# Patient Record
Sex: MALE | Race: WHITE | NOT HISPANIC OR LATINO | Employment: FULL TIME | URBAN - METROPOLITAN AREA
[De-identification: names, ages, dates, MRNs, and addresses within clinical notes are randomized per-mention and may not be internally consistent; named-entity substitution may affect disease eponyms.]

---

## 2017-05-25 ENCOUNTER — TRANSFERRED RECORDS (OUTPATIENT)
Dept: HEALTH INFORMATION MANAGEMENT | Facility: CLINIC | Age: 47
End: 2017-05-25

## 2018-08-06 ENCOUNTER — HOSPITAL ENCOUNTER (EMERGENCY)
Facility: CLINIC | Age: 48
Discharge: HOME | End: 2018-08-06
Attending: EMERGENCY MEDICINE | Admitting: EMERGENCY MEDICINE
Payer: COMMERCIAL

## 2018-08-06 ENCOUNTER — APPOINTMENT (OUTPATIENT)
Dept: CT IMAGING | Facility: CLINIC | Age: 48
End: 2018-08-06
Attending: EMERGENCY MEDICINE
Payer: COMMERCIAL

## 2018-08-06 VITALS
OXYGEN SATURATION: 100 % | SYSTOLIC BLOOD PRESSURE: 133 MMHG | TEMPERATURE: 98.2 F | RESPIRATION RATE: 16 BRPM | DIASTOLIC BLOOD PRESSURE: 94 MMHG

## 2018-08-06 DIAGNOSIS — N20.0 CALCULUS OF LEFT KIDNEY: ICD-10-CM

## 2018-08-06 PROCEDURE — 99284 EMERGENCY DEPT VISIT MOD MDM: CPT | Mod: 25

## 2018-08-06 PROCEDURE — 74176 CT ABD & PELVIS W/O CONTRAST: CPT

## 2018-08-06 RX ORDER — ONDANSETRON 4 MG/1
4 TABLET, FILM COATED ORAL EVERY 8 HOURS PRN
Qty: 6 TABLET | Refills: 0 | Status: SHIPPED | OUTPATIENT
Start: 2018-08-06 | End: 2018-09-27

## 2018-08-06 RX ORDER — OXYCODONE HYDROCHLORIDE 5 MG/1
5 TABLET ORAL EVERY 6 HOURS PRN
Qty: 12 TABLET | Refills: 0 | Status: SHIPPED | OUTPATIENT
Start: 2018-08-06 | End: 2018-09-27

## 2018-08-06 RX ORDER — TAMSULOSIN HYDROCHLORIDE 0.4 MG/1
0.4 CAPSULE ORAL DAILY
Qty: 10 CAPSULE | Refills: 0 | Status: SHIPPED | OUTPATIENT
Start: 2018-08-06 | End: 2018-08-16

## 2018-08-06 ASSESSMENT — ENCOUNTER SYMPTOMS
FLANK PAIN: 1
DYSURIA: 1
VOMITING: 0
ABDOMINAL PAIN: 1
HEMATURIA: 0
FEVER: 0
CONSTIPATION: 0
NAUSEA: 1

## 2018-08-06 NOTE — ED AVS SNAPSHOT
[facility] Emergency Department    [address]    [facility] [address]    Phone:  [phone]    Fax:  [phone]                                       Arley Roldan   MRN: [id number]    Department:  [facility] Emergency Department   Date of Visit:  8/6/2018           Patient Information     Date Of Birth          1970        Your diagnoses for this visit were:     Calculus of left kidney        You were seen by Brandon Gaston MD.      Follow-up Information     Follow up with Urology.    Why:  As needed if no stone passage/relief of symptoms in next 3-4 days        Discharge Instructions       Discharge Instructions  Kidney Stones    Kidney stones are a common problem that can cause a lot of pain but fortunately are usually not dangerous. Kidney stones form in the kidney and then can cause a blockage (obstruction) of the flow of urine from the kidney which leads to pain. Most patients can manage kidney stones at home (without a hospital stay).  However, sometimes your condition may be worse than it seemed at first, or may get worse with time. Most kidney stones will pass on their own, but occasionally stones may need to be removed by an urologist.    Generally, every Emergency Department visit should have a follow-up clinic visit with either a primary or a specialty clinic/provider. Please follow-up as instructed by your emergency provider today.      Return to the Emergency Department if:    Your pain is not controlled despite the medications provided or recommended.    You are vomiting (throwing up) and cannot keep fluids or medications down.    You develop a fever (>100.4 F).    You feel much more ill or develop new symptoms.  What can I do to help myself?    Be sure to drink plenty of fluids.    If instructed to do so, strain your urine (pee) with the urine strainer you were provided with today. Your stone may look like a grain of sand or a small pebble.  Collect any stones in the cup provided and bring to your follow-up appointment.    Staying active is good, and may help the stone to pass. You may do whatever you feel up to doing without restrictions.   Treatment:    Non-steroidal anti-inflammatory drugs (NSAIDs). This includes prescription medicines like Toradol  (ketorolac) and non-prescription medicines like Advil  (ibuprofen) and Nuprin  (ibuprofen) and Naproxen. These pain relievers are very effective for kidney stones.    Nausea (sick to your stomach) medication.  Nausea and vomiting are common with kidney stones, so your provider may send you home with medicine for this.     Flomax  (tamsulosin). This medicine is sometimes used for men with prostate problems, but also can help kidney stones to pass. Its effectiveness is controversial or questionable so it is prescribed in certain situations. This medicine can lower blood pressure, and you may feel faint/lightheaded, especially when you first stand up. Be sure to get up gradually, sit down if you feel faint, and avoid activity where feeling faint would be dangerous, such as climbing ladders.  If you were given a prescription for medicine here today, be sure to read all of the information (including the package insert) that comes with your prescription.  This will include important information about the medicine, its side effects, and any warnings that you need to know about.  The pharmacist who fills the prescription can provide more information and answer questions you may have about the medicine.  If you have questions or concerns that the pharmacist cannot address, please call or return to the Emergency Department.   Remember that you can always come back to the Emergency Department if you are not able to see your regular provider in the amount of time listed above, if you get any new symptoms, or if there is anything that worries you.  Opioid Medication Information    You have been given a prescription  for an opioid (narcotic) pain medicine and/or have received a pain medicine while here in the Emergency Department. These medicines can make you drowsy or impaired. You must not drive, operate dangerous equipment, or engage in any other dangerous activities while taking these medications. If you drive while taking these medications, you could be arrested for driving under the influence (DUI). Do not drink any alcohol while you are taking these medications.     Opioid pain medications can cause addiction. If you have a history of chemical dependency of any type, you are at a higher risk of becoming addicted to pain medications.  Only take these prescribed medications to treat your pain when all other options have been tried. Take it for as short a time and as few doses as possible. Store your pain pills in a secure place, as they are frequently stolen and provide a dangerous opportunity for children or visitors in your house to start abusing these powerful medications. We will not replace any lost or stolen medicine.    If you do not finish your medication, it is a good idea to get rid of it but please do not flush it down the toilet. Please dispose of the remaining medication at a local pharmacy or law enforcement facility. The [place] Pollution [employer] Agency has additional information on medication disposal: https://www.pca.[facility].[place]/living-green/managing-unwanted-medications.      Many prescription pain medications contain Tylenol  (acetaminophen), including Vicodin , Tylenol #3 , Norco , Lortab , and Percocet .  You should not take any extra pills of Tylenol  if you are using these prescription medications or you can get very sick.  Do not ever take more than 3000 mg of acetaminophen in any 24 hour period.    All opioids tend to cause constipation. Drink plenty of water and eat foods that have a lot of fiber, such as fruits, vegetables, prune juice, apple juice and high fiber cereal.  Take a laxative if you  don t move your bowels at least every other day. Miralax , Milk of Magnesia, Colace , or Senna  can be used to keep you regular.          Your next 10 appointments already scheduled     Aug 09, 2018 10:00 AM CDT   Office Visit with Hamilton Hanna MD   [facility] ([facility] [place])    [address]  [address]  [facility] [place] [address] [phone]           Bring a current list of meds and any records pertaining to this visit. For Physicals, please bring immunization records and any forms needing to be filled out. Please arrive 10 minutes early to complete paperwork.              24 Hour Appointment Hotline       To make an appointment at any [facility], call [phone] ([phone]). If you don't have a family doctor or clinic, we will help you find one. [facility] are conveniently located to serve the needs of you and your family.             Review of your medicines      START taking        Dose / Directions Last dose taken    oxyCODONE IR 5 MG tablet   Commonly known as:  ROXICODONE   Dose:  5 mg   Quantity:  12 tablet        Take 1 tablet (5 mg) by mouth every 6 hours as needed for pain   Refills:  0        tamsulosin 0.4 MG capsule   Commonly known as:  FLOMAX   Dose:  0.4 mg   Quantity:  10 capsule        Take 1 capsule (0.4 mg) by mouth daily for 10 doses   Refills:  0                Information about OPIOIDS     PRESCRIPTION OPIOIDS: WHAT YOU NEED TO KNOW   We gave you an opioid (narcotic) pain medicine. It is important to manage your pain, but opioids are not always the best choice. You should first try all the other options your care team gave you. Take this medicine for as short a time (and as few doses) as possible.     These medicines have risks:    DO NOT drive when on new or higher doses of pain medicine. These medicines can affect your alertness and reaction times, and you could be arrested for driving under the influence (DUI). If you  need to use opioids long-term, talk to your care team about driving.    DO NOT operate heave machinery    DO NOT do any other dangerous activities while taking these medicines.     DO NOT drink any alcohol while taking these medicines.      If the opioid prescribed includes acetaminophen, DO NOT take with any other medicines that contain acetaminophen. Read all labels carefully. Look for the word  acetaminophen  or  Tylenol.  Ask your pharmacist if you have questions or are unsure.    You can get addicted to pain medicines, especially if you have a history of addiction (chemical, alcohol or substance dependence). Talk to your care team about ways to reduce this risk.    Store your pills in a secure place, locked if possible. We will not replace any lost or stolen medicine. If you don t finish your medicine, please throw away (dispose) as directed by your pharmacist. The [place] Pollution Control Agency has more information about safe disposal: https://www.pca.[facility].[place]/living-green/managing-unwanted-medications.     All opioids tend to cause constipation. Drink plenty of water and eat foods that have a lot of fiber, such as fruits, vegetables, prune juice, apple juice and high-fiber cereal. Take a laxative (Miralax, milk of magnesia, Colace, Senna) if you don t move your bowels at least every other day.         Prescriptions were sent or printed at these locations (2 Prescriptions)                   Other Prescriptions                Printed at Department/Unit printer (2 of 2)         oxyCODONE IR (ROXICODONE) 5 MG tablet               tamsulosin (FLOMAX) 0.4 MG capsule                Procedures and tests performed during your visit     CT Abdomen Pelvis w/o Contrast      Orders Needing Specimen Collection     None      Pending Results     Date and Time Order Name Status Description    8/6/2018 1144 CT Abdomen Pelvis w/o Contrast Preliminary             Pending Culture Results     No orders found from 8/4/2018  to 8/7/2018.            Pending Results Instructions     If you had any lab results that were not finalized at the time of your Discharge, you can call the ED Lab Result RN at [phone]. You will be contacted by this team for any positive Lab results or changes in treatment. The nurses are available 7 days a week from 10A to 6:30P.  You can leave a message 24 hours per day and they will return your call.        Test Results From Your Hospital Stay        8/6/2018 12:53 PM      Narrative     CT ABDOMEN/PELVIS WITHOUT CONTRAST August 6, 2018 12:46 PM     HISTORY: Left flank pain.     TECHNIQUE: Noncontrast CT abdomen and pelvis was performed. Radiation  dose for this scan was reduced using automated exposure control,  adjustment of the mA and/or kV according to patient size, or iterative  reconstruction technique.    COMPARISON: None.    FINDINGS:   Left kidney: Distal left ureter stone just in the region of the  ureterovesical junction measures 0.2 cm in size series 2 image 75.  Mild left hydronephrosis. Intrarenal stone upper left kidney is 0.1 cm  series 3 image 81.    Right kidney: Intrarenal stone at the right mid kidney is 0.2 cm  series 2 image 30. No hydronephrosis on the right.    Additional findings: Prominent prostate measuring 5.1 cm image 80. No  acute bowel abnormality. No evidence for appendicitis. No free fluid  or free air.    Cholecystectomy. Liver, spleen, adrenals, and pancreas shows no acute  abnormalities.        Impression     IMPRESSION:   1. Distal left ureter stone at the ureterovesical junction is 0.2 cm.  Mild left hydronephrosis.  2. Small bilateral intrarenal stones.                Clinical Quality Measure: Blood Pressure Screening     Your blood pressure was checked while you were in the emergency department today. The last reading we obtained was  BP: 141/88 . Please read the guidelines below about what these numbers mean and what you should do about them.  If your systolic blood  "pressure (the top number) is less than 120 and your diastolic blood pressure (the bottom number) is less than 80, then your blood pressure is normal. There is nothing more that you need to do about it.  If your systolic blood pressure (the top number) is 120-139 or your diastolic blood pressure (the bottom number) is 80-89, your blood pressure may be higher than it should be. You should have your blood pressure rechecked within a year by a primary care provider.  If your systolic blood pressure (the top number) is 140 or greater or your diastolic blood pressure (the bottom number) is 90 or greater, you may have high blood pressure. High blood pressure is treatable, but if left untreated over time it can put you at risk for heart attack, stroke, or kidney failure. You should have your blood pressure rechecked by a primary care provider within the next 4 weeks.  If your provider in the emergency department today gave you specific instructions to follow-up with your doctor or provider even sooner than that, you should follow that instruction and not wait for up to 4 weeks for your follow-up visit.        Thank you for choosing [facility]       Thank you for choosing [facility] for your care. Our goal is always to provide you with excellent care. Hearing back from our patients is one way we can continue to improve our services. Please take a few minutes to complete the written survey that you may receive in the mail after you visit with us. Thank you!        [employer] Information     [employer] lets you send messages to your doctor, view your test results, renew your prescriptions, schedule appointments and more. To sign up, go to [email or web address] . Click on \"Log in\" on the left side of the screen, which will take you to the Welcome page. Then click on \"Sign up Now\" on the right side of the page.     You will be asked to enter the access code listed below, as well as some personal information. Please follow the " directions to create your username and password.     Your access code is: [email or web address]  Expires: 2018  2:00 PM     Your access code will  in 90 days. If you need help or a new code, please call your [facility] clinic or [phone].        Care EveryWhere ID     This is your Care EveryWhere ID. This could be used by other organizations to access your [facility] medical records  [id number]        Equal Access to Services     ADOLFO ROY : Hadii angela hall hadasho Soomaali, waaxda luqadaha, qaybta kaalmada adeegyashelby, [place] . So [facility] [phone].    ATENCIÓN: Si habla español, tiene a martinez disposición servicios gratuitos de asistencia lingüística. Llame al [phone].    We comply with applicable federal civil rights laws and [place] laws. We do not discriminate on the basis of race, color, national origin, age, disability, sex, sexual orientation, or gender identity.            After Visit Summary       This is your record. Keep this with you and show to your community pharmacist(s) and doctor(s) at your next visit.

## 2018-08-06 NOTE — ED TRIAGE NOTES
Left upper quadrant abdominal pain that started Sunday. Seen at [facility] and sent here. Has had nausea. Not aware of fever. Pain today is constant.

## 2018-08-06 NOTE — ED AVS SNAPSHOT
[facility] Emergency Department    [address]    [facility] [address]    Phone:  [phone]    Fax:  [phone]                                       Arley Roldan   MRN: [id number]    Department:  [facility] Emergency Department   Date of Visit:  8/6/2018           After Visit Summary Signature Page     I have received my discharge instructions, and my questions have been answered. I have discussed any challenges I see with this plan with the nurse or doctor.    ..........................................................................................................................................  Patient/Patient Representative Signature      ..........................................................................................................................................  Patient Representative Print Name and Relationship to Patient    ..................................................               ................................................  Date                                            Time    ..........................................................................................................................................  Reviewed by Signature/Title    ...................................................              ..............................................  Date                                                            Time

## 2018-08-06 NOTE — DISCHARGE INSTRUCTIONS
Discharge Instructions  Kidney Stones    Kidney stones are a common problem that can cause a lot of pain but fortunately are usually not dangerous. Kidney stones form in the kidney and then can cause a blockage (obstruction) of the flow of urine from the kidney which leads to pain. Most patients can manage kidney stones at home (without a hospital stay).  However, sometimes your condition may be worse than it seemed at first, or may get worse with time. Most kidney stones will pass on their own, but occasionally stones may need to be removed by an urologist.    Generally, every Emergency Department visit should have a follow-up clinic visit with either a primary or a specialty clinic/provider. Please follow-up as instructed by your emergency provider today.      Return to the Emergency Department if:    Your pain is not controlled despite the medications provided or recommended.    You are vomiting (throwing up) and cannot keep fluids or medications down.    You develop a fever (>100.4 F).    You feel much more ill or develop new symptoms.  What can I do to help myself?    Be sure to drink plenty of fluids.    If instructed to do so, strain your urine (pee) with the urine strainer you were provided with today. Your stone may look like a grain of sand or a small pebble. Collect any stones in the cup provided and bring to your follow-up appointment.    Staying active is good, and may help the stone to pass. You may do whatever you feel up to doing without restrictions.   Treatment:    Non-steroidal anti-inflammatory drugs (NSAIDs). This includes prescription medicines like Toradol  (ketorolac) and non-prescription medicines like Advil  (ibuprofen) and Nuprin  (ibuprofen) and Naproxen. These pain relievers are very effective for kidney stones.    Nausea (sick to your stomach) medication.  Nausea and vomiting are common with kidney stones, so your provider may send you home with medicine for this.     Flomax   (tamsulosin). This medicine is sometimes used for men with prostate problems, but also can help kidney stones to pass. Its effectiveness is controversial or questionable so it is prescribed in certain situations. This medicine can lower blood pressure, and you may feel faint/lightheaded, especially when you first stand up. Be sure to get up gradually, sit down if you feel faint, and avoid activity where feeling faint would be dangerous, such as climbing ladders.  If you were given a prescription for medicine here today, be sure to read all of the information (including the package insert) that comes with your prescription.  This will include important information about the medicine, its side effects, and any warnings that you need to know about.  The pharmacist who fills the prescription can provide more information and answer questions you may have about the medicine.  If you have questions or concerns that the pharmacist cannot address, please call or return to the Emergency Department.   Remember that you can always come back to the Emergency Department if you are not able to see your regular provider in the amount of time listed above, if you get any new symptoms, or if there is anything that worries you.  Opioid Medication Information    You have been given a prescription for an opioid (narcotic) pain medicine and/or have received a pain medicine while here in the Emergency Department. These medicines can make you drowsy or impaired. You must not drive, operate dangerous equipment, or engage in any other dangerous activities while taking these medications. If you drive while taking these medications, you could be arrested for driving under the influence (DUI). Do not drink any alcohol while you are taking these medications.     Opioid pain medications can cause addiction. If you have a history of chemical dependency of any type, you are at a higher risk of becoming addicted to pain medications.  Only take these  prescribed medications to treat your pain when all other options have been tried. Take it for as short a time and as few doses as possible. Store your pain pills in a secure place, as they are frequently stolen and provide a dangerous opportunity for children or visitors in your house to start abusing these powerful medications. We will not replace any lost or stolen medicine.    If you do not finish your medication, it is a good idea to get rid of it but please do not flush it down the toilet. Please dispose of the remaining medication at a local pharmacy or law enforcement facility. The [place] Pollution Control Agency has additional information on medication disposal: https://www.pca.[email or web address]/living-green/managing-unwanted-medications.      Many prescription pain medications contain Tylenol  (acetaminophen), including Vicodin , Tylenol #3 , Norco , Lortab , and Percocet .  You should not take any extra pills of Tylenol  if you are using these prescription medications or you can get very sick.  Do not ever take more than 3000 mg of acetaminophen in any 24 hour period.    All opioids tend to cause constipation. Drink plenty of water and eat foods that have a lot of fiber, such as fruits, vegetables, prune juice, apple juice and high fiber cereal.  Take a laxative if you don t move your bowels at least every other day. Miralax , Milk of Magnesia, Colace , or Senna  can be used to keep you regular.

## 2018-08-06 NOTE — ED PROVIDER NOTES
History     Chief Complaint:  Abdominal pain    HPI   Arley Roldan is a 47 year old male who presents with abdominal pain. Yesterday morning, the patient reports a one hour episode of upper left quadrant abdominal pain. This morning he woke up with similar pain and decided to go in for evaluation. Prior to arrival he was seen at [facility], where he was given a Toradol shot and sent here for further evaluation. Upon arrival to the ED he notes the pain has somewhat diminished. He reports that it does, however, radiate towards his left flank and also gives him some nausea and slight dysuria. He denies any fever, vomiting, hematuria, or any other symptoms at this time.     Allergies:  No Known Drug Allergies    Medications:    The patient is not currently taking any prescribed medications.    Past Medical History:    Migraines    Past Surgical History:    Cholecystectomy    Family History:    No past pertinent family history.    Social History:  The patient was accompanied to the ED by his wife.  Smoking Status: No  Smokeless Tobacco: No  Alcohol Use: Yes  Marital Status:        Review of Systems   Constitutional: Negative for fever.   Gastrointestinal: Positive for abdominal pain and nausea. Negative for constipation and vomiting.   Genitourinary: Positive for dysuria and flank pain. Negative for hematuria.   All other systems reviewed and are negative.    Physical Exam   Vitals:  Patient Vitals for the past 24 hrs:   BP Temp Temp src Heart Rate Resp SpO2   08/06/18 1407 - - - - - 100 %   08/06/18 1405 (!) 133/94 - - 60 16 96 %   08/06/18 1230 (!) 142/92 - - - - -   08/06/18 1215 136/90 - - - - 100 %   08/06/18 1200 134/90 - - - - 98 %   08/06/18 1145 (!) 132/92 - - - - 99 %   08/06/18 1130 (!) 127/91 - - - - 100 %   08/06/18 1127 141/88 98.2  F (36.8  C) Oral 77 16 100 %     Physical Exam  General: Patient is alert and cooperative.  HENT:  Normal appearance.   Eyes: EOMI. Normal  conjunctiva.  Neck:  Normal range of motion and appearance.   Cardiovascular:  Normal rate.  Pulmonary/Chest:  Effort normal.   Abdominal: Soft. No distension or tenderness.     Musculoskeletal: Normal range of motion.   Neurological: oriented, normal strength, sensation, and coordination.   Skin: Warm and dry. No rash or bruising.   Psychiatric: Normal mood and affect. Normal behavior and judgement.      Emergency Department Course     Imaging:  Radiology findings were communicated with the patient who voiced understanding of the findings.  CT abdomen Pelvis w/o contrast:  1. Distal left ureter stone at the ureterovesical junction is 0.2 cm. Mild left hydronephrosis.  2. Small bilateral intrarenal stones.    Per Radiologist.     Emergency Department Course:  Nursing notes and vitals reviewed.  1137 I had my initial encounter with the patient.  I performed an exam of the patient as documented above.   The patient was sent for a CT while in the emergency department, results above.     1357 I discussed the treatment plan with the patient. They expressed understanding of this plan and consented to discharge. They will be discharged home with instructions for care and follow up. In addition, the patient will return to the emergency department with any new or worsening symptoms.  All questions were answered.  Impression & Plan      Medical Decision Making:  The patient presented with unilateral flank and abdominal pain consistent with renal colic. CT confirms a ureteral stone.  Pain is controlled with interventions in the Emergency Department. There is no fever or evidence of a urinary tract infection.  The patient will be discharged with opioid analgesics and Ibuprofen for pain. Flomax will be prescribed daily to attempt to ease stone passage.  Other etiologies for these symptoms (AAA, pyelonephritis, amongst others) are considered and have been excluded.  Return if increasing pain not controlled with pain meds,  vomiting, and fever.  Strain urine to look for stone, if detected, submit to primary doctor for lab analysis.  Follow up with urology within one week, sooner if pain continues, as retrieval of the stone may be required for refractory symptoms.        Diagnosis:    ICD-10-CM    1. Calculus of left kidney N20.0      Disposition:   Discharge    Discharge Medications:  Discharge Medication List as of 8/6/2018  2:00 PM      START taking these medications    Details   oxyCODONE IR (ROXICODONE) 5 MG tablet Take 1 tablet (5 mg) by mouth every 6 hours as needed for pain, Disp-12 tablet, R-0, Local Print      tamsulosin (FLOMAX) 0.4 MG capsule Take 1 capsule (0.4 mg) by mouth daily for 10 doses, Disp-10 capsule, R-0, Local Print           Scribe Disclosure:  New GARCIA, am serving as a scribe at 11:26 AM on 8/6/2018 to document services personally performed by Brandon Gaston MD, based on my observations and the provider's statements to me.  8/6/2018   [facility] EMERGENCY DEPARTMENT     Brandon Gaston MD  08/07/18 9416

## 2018-08-06 NOTE — LETTER
August 6, 2018      To Whom It May Concern:      Arley Roldan was seen in our Emergency Department today, 08/06/18.  I expect his condition to improve over the next 3 days.  He may return to work/school when improved.    Sincerely,        Dr. Gaston/ JAIME Zhou

## 2018-08-09 ENCOUNTER — OFFICE VISIT (OUTPATIENT)
Dept: PEDIATRICS | Facility: CLINIC | Age: 48
End: 2018-08-09
Payer: COMMERCIAL

## 2018-08-09 VITALS
WEIGHT: 154 LBS | SYSTOLIC BLOOD PRESSURE: 118 MMHG | OXYGEN SATURATION: 97 % | BODY MASS INDEX: 23.34 KG/M2 | HEART RATE: 82 BPM | DIASTOLIC BLOOD PRESSURE: 70 MMHG | HEIGHT: 68 IN | TEMPERATURE: 98.9 F

## 2018-08-09 DIAGNOSIS — N20.0 NEPHROLITHIASIS: ICD-10-CM

## 2018-08-09 DIAGNOSIS — G43.109 MIGRAINE WITH AURA AND WITHOUT STATUS MIGRAINOSUS, NOT INTRACTABLE: Primary | ICD-10-CM

## 2018-08-09 PROCEDURE — 99204 OFFICE O/P NEW MOD 45 MIN: CPT | Performed by: STUDENT IN AN ORGANIZED HEALTH CARE EDUCATION/TRAINING PROGRAM

## 2018-08-09 RX ORDER — CHOLESTYRAMINE
POWDER (GRAM) MISCELLANEOUS
Status: CANCELLED | COMMUNITY
Start: 2018-08-09

## 2018-08-09 RX ORDER — CHOLESTYRAMINE 4 G/9G
4 POWDER, FOR SUSPENSION ORAL DAILY
Qty: 378 G | Refills: 1 | Status: SHIPPED | OUTPATIENT
Start: 2018-08-09 | End: 2019-01-18

## 2018-08-09 RX ORDER — ZOLMITRIPTAN 5 MG/1
5 TABLET, FILM COATED ORAL
Qty: 6 TABLET | Refills: 3 | COMMUNITY
Start: 2018-08-09 | End: 2019-05-03

## 2018-08-09 NOTE — PATIENT INSTRUCTIONS
"  Migraine Headache: Stages and Treatment    A migraine headache tends to progress in stages. Learning these stages can help you better understand what is happening. Then you can learn ways to reduce pain and relieve other symptoms. Methods for relieving your symptoms include self-care and medicines.  Migraine stages  Migraines tend to progress through 4 stages. Many people don't have all stages, and stages may differ with each headache:    Prodrome. A few hours to a day or so before the headache, you may feel tired, (yawning many times), uneasy, or lemus. You may also feel bloated or crave certain foods.    Aura. Up to an hour before the headache starts, some migraine sufferers experience aura--flashing lights, blind spots, other vision problems, confusion, difficulty speaking, or other neurologic symptoms.    Headache. Moderate to severe pain affects one side of the head and then can spread to both sides, often along with nausea. You may be highly sensitive to light, sound, and odors. Vomiting or diarrhea may also happen. This stage lasts 4 to 72 hours.    Postdrome. After your headache ends, you may feel tired, achy, and \"washed out.\" This may last for a day or so.  Self-care during a migraine  Here is what you can do:    Use a cold compress. Wrap a thin cloth around a cold pack, a cold can of soda, or a bag of frozen vegetables. Apply this to your temple or other pain site.    Drink fluids. If nausea makes it hard to drink, try sucking on ice.    Rest. If possible, lie down. Try not to bend over, as this may increase your pain. Sometimes laying in a dark quiet room can help the migraine from being aggravated.      Try caffeine. Some people find that drinking fluids with caffeine, such as coffee or tea, helps to lessen migraine pain.  Using medicines  Work with your healthcare provider to find the right medicines for you. Medicines for migraine may relieve pain (analgesics), relieve nausea, or attack the " migraine's root causes (migraine-specific medicines).  Rebound headache  Taking analgesics each day, or even several times a week, may lead to more frequent and severe headaches. These are called rebound headaches. If you think you're having rebound headaches, tell your healthcare provider. He or she can help you safely decrease your medicine. Rebound caffeine withdrawal headaches can also happen.    Date Last Reviewed: 10/9/2015    2372-6748 [employer]. [address] [place] [address], [place] [address]. All rights reserved. This information is not intended as a substitute for professional medical care. Always follow your healthcare professional's instructions.    Please continue to take your medications as prescribed.  Please call when refills are needed.  Call with questions or concerns!    Hamilton Hanna MD

## 2018-08-09 NOTE — MR AVS SNAPSHOT
"              After Visit Summary   8/9/2018    Arley Roldan    MRN: [id number]           Patient Information     Date Of Birth          1970        Visit Information        Provider Department      8/9/2018 10:00 AM Hamilton Hanna MD [facility]        Today's Diagnoses     Calculus of kidney    -  1    Migraine with aura and without status migrainosus, not intractable        S/P cholecystectomy          Care Instructions      Migraine Headache: Stages and Treatment    A migraine headache tends to progress in stages. Learning these stages can help you better understand what is happening. Then you can learn ways to reduce pain and relieve other symptoms. Methods for relieving your symptoms include self-care and medicines.  Migraine stages  Migraines tend to progress through 4 stages. Many people don't have all stages, and stages may differ with each headache:    Prodrome. A few hours to a day or so before the headache, you may feel tired, (yawning many times), uneasy, or lemus. You may also feel bloated or crave certain foods.    Aura. Up to an hour before the headache starts, some migraine sufferers experience aura--flashing lights, blind spots, other vision problems, confusion, difficulty speaking, or other neurologic symptoms.    Headache. Moderate to severe pain affects one side of the head and then can spread to both sides, often along with nausea. You may be highly sensitive to light, sound, and odors. Vomiting or diarrhea may also happen. This stage lasts 4 to 72 hours.    Postdrome. After your headache ends, you may feel tired, achy, and \"washed out.\" This may last for a day or so.  Self-care during a migraine  Here is what you can do:    Use a cold compress. Wrap a thin cloth around a cold pack, a cold can of soda, or a bag of frozen vegetables. Apply this to your temple or other pain site.    Drink fluids. If nausea makes it hard to drink, try sucking on ice.    Rest. If " possible, lie down. Try not to bend over, as this may increase your pain. Sometimes laying in a dark quiet room can help the migraine from being aggravated.      Try caffeine. Some people find that drinking fluids with caffeine, such as coffee or tea, helps to lessen migraine pain.  Using medicines  Work with your healthcare provider to find the right medicines for you. Medicines for migraine may relieve pain (analgesics), relieve nausea, or attack the migraine's root causes (migraine-specific medicines).  Rebound headache  Taking analgesics each day, or even several times a week, may lead to more frequent and severe headaches. These are called rebound headaches. If you think you're having rebound headaches, tell your healthcare provider. He or she can help you safely decrease your medicine. Rebound caffeine withdrawal headaches can also happen.    Date Last Reviewed: 10/9/2015    9334-7322 [employer]. [address] [place] [address]. All rights reserved. This information is not intended as a substitute for professional medical care. Always follow your healthcare professional's instructions.    Please continue to take your medications as prescribed.  Please call when refills are needed.  Call with questions or concerns!    Hamilton Hanna MD            Follow-ups after your visit        Follow-up notes from your care team     Return in about 3 months (around 11/9/2018) for Physical Exam.      Who to contact     If you have questions or need follow up information about today's clinic visit or your schedule please contact [facility] directly at [phone].  Normal or non-critical lab and imaging results will be communicated to you by MyChart, letter or phone within 4 business days after the clinic has received the results. If you do not hear from us within 7 days, please contact the clinic through MyChart or phone. If you have a critical or abnormal lab result, we will notify  "you by phone as soon as possible.  Submit refill requests through [employer] or call your pharmacy and they will forward the refill request to us. Please allow 3 business days for your refill to be completed.          Additional Information About Your Visit        [employer] Information     [employer] lets you send messages to your doctor, view your test results, renew your prescriptions, schedule appointments and more. To sign up, go to [email or web address] . Click on \"Log in\" on the left side of the screen, which will take you to the Welcome page. Then click on \"Sign up Now\" on the right side of the page.     You will be asked to enter the access code listed below, as well as some personal information. Please follow the directions to create your username and password.     Your access code is: [email or web address]  Expires: 2018  2:00 PM     Your access code will  in 90 days. If you need help or a new code, please call your [facility] clinic or [phone].        Care EveryWhere ID     This is your Care EveryWhere ID. This could be used by other organizations to access your [facility] medical records  [id number]        Your Vitals Were     Pulse Temperature Height Pulse Oximetry BMI (Body Mass Index)       82 98.9  F (37.2  C) (Oral) 5' 7.5\" (1.715 m) 97% 23.76 kg/m2        Blood Pressure from Last 3 Encounters:   18 118/70   18 (!) 133/94    Weight from Last 3 Encounters:   18 154 lb (69.9 kg)              Today, you had the following     No orders found for display         Today's Medication Changes          These changes are accurate as of 18 10:32 AM.  If you have any questions, ask your nurse or doctor.               Start taking these medicines.        Dose/Directions    cholestyramine 4 GM/DOSE powder   Commonly known as:  QUESTRAN   Used for:  S/P cholecystectomy   Started by:  Hamilton Hanna MD        Dose:  4 g   Take 4 g by mouth daily   Quantity:  378 g   Refills:  1       "      Where to get your medicines      These medications were sent to [facility] - [place], [place] - [address]  [address], [facility] [address]     Phone:  [phone]     cholestyramine 4 GM/DOSE powder                Primary Care Provider Office Phone # Fax #    Hamilton Amish Hanna [phone] [address] [facility] [address]        Equal Access to Services     ADOLFO ROY : Hadii aad ku hadasho Soomaali, waaxda luqadaha, qaybta kaalmada adeegyada, waxay idiin hayaan adeeg sangeetaaralogan zelaya. So [facility] [phone].    ATENCIÓN: Si habla español, tiene a martinez disposición servicios gratuitos de asistencia lingüística. Usman al [phone].    We comply with applicable federal civil rights laws and [place] laws. We do not discriminate on the basis of race, color, national origin, age, disability, sex, sexual orientation, or gender identity.            Thank you!     Thank you for choosing [facility]  for your care. Our goal is always to provide you with excellent care. Hearing back from our patients is one way we can continue to improve our services. Please take a few minutes to complete the written survey that you may receive in the mail after your visit with us. Thank you!             Your Updated Medication List - Protect others around you: Learn how to safely use, store and throw away your medicines at www.disposemymeds.org.          This list is accurate as of 8/9/18 10:32 AM.  Always use your most recent med list.                   Brand Name Dispense Instructions for use Diagnosis    cholestyramine 4 GM/DOSE powder    QUESTRAN    378 g    Take 4 g by mouth daily    S/P cholecystectomy       ondansetron 4 MG tablet    ZOFRAN    6 tablet    Take 1 tablet (4 mg) by mouth every 8 hours as needed for nausea        oxyCODONE IR 5 MG tablet    ROXICODONE    12 tablet    Take 1 tablet (5 mg) by mouth every  6 hours as needed for pain        tamsulosin 0.4 MG capsule    FLOMAX    10 capsule    Take 1 capsule (0.4 mg) by mouth daily for 10 doses        ZOLMitriptan 5 MG tablet    ZOMIG    6 tablet    Take 1 tablet (5 mg) by mouth at onset of headache for migraine May repeat in 2 hours. Max 2 tablets/24 hours.    Migraine with aura and without status migrainosus, not intractable

## 2018-08-09 NOTE — PROGRESS NOTES
SUBJECTIVE:   Arley Roldan is a 47 year old male who presents to clinic today for the following health issues:      New Patient/Transfer of Care    Hx of migraines - 15 a month.  Auras, visual.  With migraine has taste and hearing auras.  Auras do not always predate migraine.  No prior ppx medication.  CT of head in past within normal limits per the patient.  Seen Dr. Ramirez, neurologist at [facility] in [place] and given depakote for his symptoms; discontinued after mood changed.  Debillatating to quality of life and work.  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  No nausea dizziness or throat pain related to zomig.    Nephrolithiasis   recent ureteral stone.    Took a total of oxycodone 3 tabs for relief.  No gross hematuria.  No plan for follow up at this point; patient believes he may or is in process of passing stone.  Compliant with flomax and will finish complete course. No nausea/emesis; PRN zofran x1 for relief.  No family history of kidney stones.      Of note family has history of gallbladder stones.  Prior personal history of alcoholism 10 years prior, rare use currently.      Problem list and histories reviewed & adjusted, as indicated.  Additional history: as documented    There is no problem list on file for this patient.    Past Surgical History:   Procedure Laterality Date     CHOLECYSTECTOMY  2013    No known complications s/p surgery         Social History   Substance Use Topics     Smoking status: Never Smoker     Smokeless tobacco: Never Used     Alcohol use Yes      Comment: Rare; social     Family History   Problem Relation Age of Onset     Alcoholism Brother      Pancreatic Cancer Maternal Grandmother      Colon Cancer Maternal Grandfather          Current Outpatient Prescriptions   Medication Sig Dispense Refill     cholestyramine (QUESTRAN) 4 GM/DOSE powder Take 4 g by mouth daily 378 g 1     ondansetron (ZOFRAN) 4 MG tablet Take 1 tablet (4 mg) by mouth every  "8 hours as needed for nausea 6 tablet 0     oxyCODONE IR (ROXICODONE) 5 MG tablet Take 1 tablet (5 mg) by mouth every 6 hours as needed for pain 12 tablet 0     tamsulosin (FLOMAX) 0.4 MG capsule Take 1 capsule (0.4 mg) by mouth daily for 10 doses 10 capsule 0     ZOLMitriptan (ZOMIG) 5 MG tablet Take 1 tablet (5 mg) by mouth at onset of headache for migraine May repeat in 2 hours. Max 2 tablets/24 hours. 6 tablet 3     No Known Allergies  No lab results found.   BP Readings from Last 3 Encounters:   08/09/18 118/70   08/06/18 (!) 133/94    Wt Readings from Last 3 Encounters:   08/09/18 154 lb (69.9 kg)                    Reviewed and updated as needed this visit by clinical staff  Tobacco  Allergies  Meds       Reviewed and updated as needed this visit by Provider         ROS:  A focused ROS was obtained and documented for notable findings in the HPI.    OBJECTIVE:     /70 (Cuff Size: Adult Regular)  Pulse 82  Temp 98.9  F (37.2  C) (Oral)  Ht 5' 7.5\" (1.715 m)  Wt 154 lb (69.9 kg)  SpO2 97%  BMI 23.76 kg/m2  Body mass index is 23.76 kg/(m^2).  GENERAL: healthy, alert and no distress  EYES: Eyes grossly normal to inspection, PERRL and conjunctivae and sclerae normal  NECK: no adenopathy, no asymmetry, masses, or scars and thyroid normal to palpation  RESP: lungs clear to auscultation - no rales, rhonchi or wheezes  CV: regular rate and rhythm, normal S1 S2, no S3 or S4, no murmur, click or rub, no peripheral edema and peripheral pulses strong  ABDOMEN: soft,  no hepatosplenomegaly, no masses and bowel sounds normal, no CVA/flank tenderness  MS: no gross musculoskeletal defects noted, no edema  SKIN: no suspicious lesions or rashes  NEURO: Normal strength and tone, mentation intact and speech normal    Diagnostic Test Results:  none     ASSESSMENT/PLAN:   1. Calculus of kidney  Currently asymptomatic.  continuing to strain urine for stone collection  - drink lots of fluid, PRN oxycodone and zofran for " symptom control, and monitoring for signs of UTI  - flomax 0.4 mg daily for a total of 10 days  - monitor for passage of stone for analysis  Discussed follow-up in 1 week if stone fails to pass or sx persist    2. Migraine with aura and without status migrainosus, not intractable  - ZOLMitriptan (ZOMIG) 5 MG tablet; Take 1 tablet (5 mg) by mouth at onset of headache for migraine May repeat in 2 hours. Max 2 tablets/24 hours.  Dispense: 6 tablet; Refill: 3    Follow up in the next 1-3 months for annual physical examination.  Obtaining outside records     The patient was discussed with Dr. Ian Hathaway, the attending, who agrees with the plan as stated above.    Hamilton Hanna MD  [facility]    ===========  STAFF NOTE:  Patient discussed with resident physician today.  We discussed cervantes portions of the visit and I participated in the evaluation and management of the patient today.     Ian Hathaway MD     ===========  STAFF NOTE:  Patient discussed with resident physician today.  We discussed cervantes portions of the visit and I participated in the evaluation and management of the patient today.     Ian Hathaway MD

## 2018-08-11 DIAGNOSIS — G43.109 MIGRAINE WITH AURA AND WITHOUT STATUS MIGRAINOSUS, NOT INTRACTABLE: ICD-10-CM

## 2018-08-11 NOTE — TELEPHONE ENCOUNTER
"Requested Prescriptions   Pending Prescriptions Disp Refills     ZOLMitriptan (ZOMIG) 5 MG tablet  Last Written Prescription Date:  08/09/2018  Last Fill Quantity: 6 tablet,  # refills: 3   Last office visit found with prescribing provider:  08/09/2018 Jacques   Future Office Visit:   Next 5 appointments (look out 90 days)     Sep 27, 2018 10:00 AM CDT   [facility]YSICAL with [facility] Hanna MD   [facility]    [address] [place] [address]  [address]  [facility] [id number] [phone]                  6 tablet 3     Sig: Take 1 tablet (5 mg) by mouth at onset of headache for migraine May repeat in 2 hours. Max 2 tablets/24 hours.    Serotonin Agonists Failed    8/11/2018  1:22 PM       Failed - Serotonin Agonist request needs review.    Please review patient's record. If patient has had 8 or more treatments in the past month, please forward to provider.         Passed - Blood pressure under 140/90 in past 12 months    BP Readings from Last 3 Encounters:   08/09/18 118/70   08/06/18 (!) 133/94                Passed - Recent (12 mo) or future (30 days) visit within the authorizing provider's specialty    Patient had office visit in the last 12 months or has a visit in the next 30 days with authorizing provider or within the authorizing provider's specialty.  See \"Patient Info\" tab in inbasket, or \"Choose Columns\" in Meds & Orders section of the refill encounter.           Passed - Patient is age 18 or older          "

## 2018-08-12 PROBLEM — G43.109 MIGRAINE WITH AURA AND WITHOUT STATUS MIGRAINOSUS, NOT INTRACTABLE: Status: ACTIVE | Noted: 2018-08-12

## 2018-08-12 PROBLEM — N20.0 NEPHROLITHIASIS: Status: ACTIVE | Noted: 2018-08-12

## 2018-08-14 RX ORDER — ZOLMITRIPTAN 5 MG/1
5 TABLET, FILM COATED ORAL
Qty: 6 TABLET | Refills: 3 | OUTPATIENT
Start: 2018-08-14

## 2018-09-27 ENCOUNTER — OFFICE VISIT (OUTPATIENT)
Dept: PEDIATRICS | Facility: CLINIC | Age: 48
End: 2018-09-27
Payer: COMMERCIAL

## 2018-09-27 VITALS
WEIGHT: 157.5 LBS | HEART RATE: 74 BPM | DIASTOLIC BLOOD PRESSURE: 82 MMHG | BODY MASS INDEX: 23.87 KG/M2 | HEIGHT: 68 IN | OXYGEN SATURATION: 99 % | SYSTOLIC BLOOD PRESSURE: 128 MMHG | TEMPERATURE: 98.4 F

## 2018-09-27 DIAGNOSIS — R03.0 ELEVATED BLOOD PRESSURE READING WITHOUT DIAGNOSIS OF HYPERTENSION: ICD-10-CM

## 2018-09-27 DIAGNOSIS — N20.0 NEPHROLITHIASIS: ICD-10-CM

## 2018-09-27 DIAGNOSIS — Z23 NEED FOR PROPHYLACTIC VACCINATION AND INOCULATION AGAINST INFLUENZA: ICD-10-CM

## 2018-09-27 DIAGNOSIS — Z23 NEED FOR VACCINATION: ICD-10-CM

## 2018-09-27 DIAGNOSIS — G43.109 MIGRAINE WITH AURA AND WITHOUT STATUS MIGRAINOSUS, NOT INTRACTABLE: ICD-10-CM

## 2018-09-27 DIAGNOSIS — Z00.00 ROUTINE GENERAL MEDICAL EXAMINATION AT A HEALTH CARE FACILITY: Primary | ICD-10-CM

## 2018-09-27 PROCEDURE — 90472 IMMUNIZATION ADMIN EACH ADD: CPT | Performed by: STUDENT IN AN ORGANIZED HEALTH CARE EDUCATION/TRAINING PROGRAM

## 2018-09-27 PROCEDURE — 99396 PREV VISIT EST AGE 40-64: CPT | Mod: 25 | Performed by: STUDENT IN AN ORGANIZED HEALTH CARE EDUCATION/TRAINING PROGRAM

## 2018-09-27 PROCEDURE — 90686 IIV4 VACC NO PRSV 0.5 ML IM: CPT | Performed by: STUDENT IN AN ORGANIZED HEALTH CARE EDUCATION/TRAINING PROGRAM

## 2018-09-27 PROCEDURE — 90715 TDAP VACCINE 7 YRS/> IM: CPT | Performed by: STUDENT IN AN ORGANIZED HEALTH CARE EDUCATION/TRAINING PROGRAM

## 2018-09-27 PROCEDURE — 90471 IMMUNIZATION ADMIN: CPT | Performed by: STUDENT IN AN ORGANIZED HEALTH CARE EDUCATION/TRAINING PROGRAM

## 2018-09-27 RX ORDER — SUMATRIPTAN 5 MG/1
1-2 SPRAY NASAL PRN
Qty: 1 EACH | Refills: 1 | Status: SHIPPED | OUTPATIENT
Start: 2018-09-27 | End: 2021-09-17

## 2018-09-27 ASSESSMENT — ENCOUNTER SYMPTOMS
EYE PAIN: 0
PARESTHESIAS: 0
HEADACHES: 1
WEAKNESS: 0
SHORTNESS OF BREATH: 0
FEVER: 0
PALPITATIONS: 0
HEMATURIA: 0
HEARTBURN: 1
DIZZINESS: 0
COUGH: 0
HEMATOCHEZIA: 0
DYSURIA: 0
ARTHRALGIAS: 0
NAUSEA: 1
ABDOMINAL PAIN: 0
CONSTIPATION: 0
FREQUENCY: 0
DIARRHEA: 0
SORE THROAT: 0
NERVOUS/ANXIOUS: 0
CHILLS: 0
MYALGIAS: 0
JOINT SWELLING: 0
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
## 2018-09-27 NOTE — MR AVS SNAPSHOT
After Visit Summary   9/27/2018    Arley Roldan    MRN: [id number]           Patient Information     Date Of Birth          1970        Visit Information        Provider Department      9/27/2018 10:00 AM Hamilton Hanna MD [facility]        Today's Diagnoses     Routine general medical examination at a health care facility    -  1    Migraine with aura and without status migrainosus, not intractable        Need for vaccination        Need for prophylactic vaccination and inoculation against influenza        Elevated blood pressure reading without diagnosis of hypertension          Care Instructions      Preventive Health Recommendations  Male Ages 40 to 49    Yearly exam:             See your health care provider every year in order to  o   Review health changes.   o   Discuss preventive care.    o   Review your medicines if your doctor has prescribed any.    You should be tested each year for STDs (sexually transmitted diseases) if you re at risk.     Have a cholesterol test every 5 years.     Have a colonoscopy (test for colon cancer) if someone in your family has had colon cancer or polyps before age 50.     After age 45, have a diabetes test (fasting glucose). If you are at risk for diabetes, you should have this test every 3 years.      Talk with your health care provider about whether or not a prostate cancer screening test (PSA) is right for you.    Shots: Get a flu shot each year. Get a tetanus shot every 10 years.     Nutrition:    Eat at least 5 servings of fruits and vegetables daily.     Eat whole-grain bread, whole-wheat pasta and brown rice instead of white grains and rice.     Get adequate Calcium and Vitamin D.     Lifestyle    Exercise for at least 150 minutes a week (30 minutes a day, 5 days a week). This will help you control your weight and prevent disease.     Limit alcohol to one drink per day.     No smoking.     Wear sunscreen to prevent skin  cancer.     See your dentist every six months for an exam and cleaning.    Please check your blood pressure weekly to determine if blood pressure related to migraines or undiagnosed hypertension.  Please continue to be physically active, balanced diet, monitor weight, and get at least 7-8 hours of sleep a night.  I will see you in 3 months time to reassess your blood pressure and any symptoms.     Hamilton Hanna MD          Follow-ups after your visit        Additional Services     GASTROENTEROLOGY ADULT REF PROCEDURE ONLY Abby Maggie [phone]; [facility]       Last Lab Result: Creatinine (mg/dL)       Date                     Value                 04/01/2013               0.92             ----------  Body mass index is 24.3 kg/(m^2).     Needed:  No  Language:  English    Patient will be contacted to schedule procedure.     Please be aware that coverage of these services is subject to the terms and limitations of your health insurance plan.  Call member services at your health plan with any benefit or coverage questions.  Any procedures must be performed at a [facility] facility OR coordinated by your clinic's referral office.    Please bring the following with you to your appointment:    (1) Any X-Rays, CTs or MRIs which have been performed.  Contact the facility where they were done to arrange for  prior to your scheduled appointment.    (2) List of current medications   (3) This referral request   (4) Any documents/labs given to you for this referral                  Follow-up notes from your care team     Return in about 3 months (around 12/27/2018) for Routine Visit.      Future tests that were ordered for you today     Open Future Orders        Priority Expected Expires Ordered    GASTROENTEROLOGY ADULT REF PROCEDURE ONLY Abby Maggie [phone]; [facility] Routine 9/27/2018 9/27/2019 9/27/2018    **CBC with platelets FUTURE 2mo Routine  "2018    **Basic metabolic panel FUTURE 2mo Routine 2018    Lipid panel reflex to direct LDL Fasting Routine 2018            Who to contact     If you have questions or need follow up information about today's clinic visit or your schedule please contact [facility] directly at [phone].  Normal or non-critical lab and imaging results will be communicated to you by NetScalerhart, letter or phone within 4 business days after the clinic has received the results. If you do not hear from us within 7 days, please contact the clinic through NetScalerhart or phone. If you have a critical or abnormal lab result, we will notify you by phone as soon as possible.  Submit refill requests through [email or web address] or call your pharmacy and they will forward the refill request to us. Please allow 3 business days for your refill to be completed.          Additional Information About Your Visit        [email or web address] Information     [email or web address] lets you send messages to your doctor, view your test results, renew your prescriptions, schedule appointments and more. To sign up, go to [email or web address] . Click on \"Log in\" on the left side of the screen, which will take you to the Welcome page. Then click on \"Sign up Now\" on the right side of the page.     You will be asked to enter the access code listed below, as well as some personal information. Please follow the directions to create your username and password.     Your access code is: [email or web address]  Expires: 2018  2:00 PM     Your access code will  in 90 days. If you need help or a new code, please call your [facility] clinic or [phone].        Care EveryWhere ID     This is your Care EveryWhere ID. This could be used by other organizations to access your [facility] medical records  [id number]        Your Vitals Were     Pulse Temperature Height Pulse Oximetry BMI (Body Mass Index)       74 98.4  F (36.9 " " C) (Oral) 5' 7.5\" (1.715 m) 99% 24.3 kg/m2        Blood Pressure from Last 3 Encounters:   09/27/18 128/82   08/09/18 118/70   08/06/18 (!) 133/94    Weight from Last 3 Encounters:   09/27/18 157 lb 8 oz (71.4 kg)   08/09/18 154 lb (69.9 kg)              We Performed the Following     1st  Administration  [84366]     FLU VACCINE, SPLIT VIRUS, IM (QUADRIVALENT) [27365]- >3 YRS     GLUCOSE     TDAP VACCINE (ADACEL) [88775.002]     Vaccine Administration, Each Additional [22380]          Today's Medication Changes          These changes are accurate as of 9/27/18 10:46 AM.  If you have any questions, ask your nurse or doctor.               Start taking these medicines.        Dose/Directions    SUMAtriptan 5 MG/ACT nasal spray   Commonly known as:  IMITREX   Used for:  Migraine with aura and without status migrainosus, not intractable   Started by:  Hamilton Hanna MD        Dose:  1-2 spray   Spray 1-2 sprays in nostril as needed for migraine May repeat in 2 hours. Max 8 sprays/24 hours.   Quantity:  1 each   Refills:  1            Where to get your medicines      These medications were sent to [facility] [address] [address] AT SEC of [facility] [address]  [address] [facility] [address] [address]     Phone:  [phone]     SUMAtriptan 5 MG/ACT nasal spray                Primary Care Provider Office Phone # Fax #    Hamilton Hanna [phone]       [facility] [address]        Equal Access to Services     [facility] AH: Hadii angela zapata Sopaola, waleslyda luqadaha, qaybta kaalmada rejiyashelby, fernando zelaya. So [facility] [phone].    ATENCIÓN: Si habla español, tiene a martinez disposición servicios gratuitos de asistencia lingüística. Llame al [phone].    We comply with applicable federal civil rights laws and [place] laws. We do not discriminate on the basis of race, color, national origin, " age, disability, sex, sexual orientation, or gender identity.            Thank you!     Thank you for choosing [facility]  for your care. Our goal is always to provide you with excellent care. Hearing back from our patients is one way we can continue to improve our services. Please take a few minutes to complete the written survey that you may receive in the mail after your visit with us. Thank you!             Your Updated Medication List - Protect others around you: Learn how to safely use, store and throw away your medicines at www.disposemymeds.org.          This list is accurate as of 9/27/18 10:46 AM.  Always use your most recent med list.                   Brand Name Dispense Instructions for use Diagnosis    cholestyramine 4 GM/DOSE powder    QUESTRAN    378 g    Take 4 g by mouth daily        SUMAtriptan 5 MG/ACT nasal spray    IMITREX    1 each    Spray 1-2 sprays in nostril as needed for migraine May repeat in 2 hours. Max 8 sprays/24 hours.    Migraine with aura and without status migrainosus, not intractable       ZOLMitriptan 5 MG tablet    ZOMIG    6 tablet    Take 1 tablet (5 mg) by mouth at onset of headache for migraine May repeat in 2 hours. Max 2 tablets/24 hours.    Migraine with aura and without status migrainosus, not intractable

## 2018-09-27 NOTE — PATIENT INSTRUCTIONS
Preventive Health Recommendations  Male Ages 40 to 49    Yearly exam:             See your health care provider every year in order to  o   Review health changes.   o   Discuss preventive care.    o   Review your medicines if your doctor has prescribed any.    You should be tested each year for STDs (sexually transmitted diseases) if you re at risk.     Have a cholesterol test every 5 years.     Have a colonoscopy (test for colon cancer) if someone in your family has had colon cancer or polyps before age 50.     After age 45, have a diabetes test (fasting glucose). If you are at risk for diabetes, you should have this test every 3 years.      Talk with your health care provider about whether or not a prostate cancer screening test (PSA) is right for you.    Shots: Get a flu shot each year. Get a tetanus shot every 10 years.     Nutrition:    Eat at least 5 servings of fruits and vegetables daily.     Eat whole-grain bread, whole-wheat pasta and brown rice instead of white grains and rice.     Get adequate Calcium and Vitamin D.     Lifestyle    Exercise for at least 150 minutes a week (30 minutes a day, 5 days a week). This will help you control your weight and prevent disease.     Limit alcohol to one drink per day.     No smoking.     Wear sunscreen to prevent skin cancer.     See your dentist every six months for an exam and cleaning.    Please check your blood pressure weekly to determine if blood pressure related to migraines or undiagnosed hypertension.  Please continue to be physically active, balanced diet, monitor weight, and get at least 7-8 hours of sleep a night.  I will see you in 3 months time to reassess your blood pressure and any symptoms.     Hamilton Hanna MD

## 2018-09-27 NOTE — PROGRESS NOTES
Injectable Influenza Immunization Documentation    1.  Is the person to be vaccinated sick today?   No    2. Does the person to be vaccinated have an allergy to a component   of the vaccine?   No  Egg Allergy Algorithm Link    3. Has the person to be vaccinated ever had a serious reaction   to influenza vaccine in the past?   No    4. Has the person to be vaccinated ever had Guillain-Barré syndrome?   No    Form completed by Torrie Erwin MA// September 27, 2018 10:12 AM

## 2019-01-16 DIAGNOSIS — N20.0 NEPHROLITHIASIS: Primary | ICD-10-CM

## 2019-01-17 NOTE — TELEPHONE ENCOUNTER
"Requested Prescriptions   Pending Prescriptions Disp Refills     cholestyramine (QUESTRAN) 4 GM/DOSE powder    Last Written Prescription Date:  8/9/2018  Last Fill Quantity: 378g,  # refills: 1   Last office visit: 9/27/2018 with prescribing provider:  Hamilton Hanna     Future Office Visit:     378 g 1     Sig: Take 4 g by mouth daily    Bile Acid Sequestrant Agents Failed - 1/16/2019  5:24 PM       Failed - Lipid panel on file in past 12 mos    No lab results found.           Passed - Recent (12 mo) or future (30 days) visit within the authorizing provider's specialty    Patient had office visit in the last 12 months or has a visit in the next 30 days with authorizing provider or within the authorizing provider's specialty.  See \"Patient Info\" tab in inbasket, or \"Choose Columns\" in Meds & Orders section of the refill encounter.             Passed - Medication is active on med list       Passed - Patient is age 18 years or older          "

## 2019-01-18 RX ORDER — CHOLESTYRAMINE 4 G/9G
4 POWDER, FOR SUSPENSION ORAL DAILY
Qty: 378 G | Refills: 0 | Status: SHIPPED | OUTPATIENT
Start: 2019-01-18 | End: 2019-02-27

## 2019-01-18 NOTE — TELEPHONE ENCOUNTER
Medication is being filled for 1 time refill only due to:  Patient needs labs fasting lab only appt needed. Patient needs to be seen because Due for 3 month follow up appt.     Chiara Mays RN

## 2019-01-31 NOTE — TELEPHONE ENCOUNTER
Called and left message for call back with clinic number. Please assist with scheduling when patient calls. [employer] message was sent as well. Charissa Hagen MA

## 2019-02-07 ENCOUNTER — MYC MEDICAL ADVICE (OUTPATIENT)
Dept: PEDIATRICS | Facility: CLINIC | Age: 49
End: 2019-02-07

## 2019-02-22 NOTE — TELEPHONE ENCOUNTER
1st attempt, called and talked to patient, scheduled with Dr. Hathaway on 2/27/19, needs fasting labs at that time.  Thiago Rodriguez CMA ([facility])

## 2019-02-27 ENCOUNTER — OFFICE VISIT (OUTPATIENT)
Dept: PEDIATRICS | Facility: CLINIC | Age: 49
End: 2019-02-27
Payer: COMMERCIAL

## 2019-02-27 VITALS
TEMPERATURE: 98.2 F | HEIGHT: 68 IN | SYSTOLIC BLOOD PRESSURE: 118 MMHG | BODY MASS INDEX: 24.25 KG/M2 | WEIGHT: 160 LBS | HEART RATE: 70 BPM | DIASTOLIC BLOOD PRESSURE: 80 MMHG | OXYGEN SATURATION: 98 %

## 2019-02-27 DIAGNOSIS — Z13.6 CARDIOVASCULAR SCREENING; LDL GOAL LESS THAN 160: ICD-10-CM

## 2019-02-27 DIAGNOSIS — K52.9 CHRONIC DIARRHEA: Primary | ICD-10-CM

## 2019-02-27 LAB
ANION GAP SERPL CALCULATED.3IONS-SCNC: 6 MMOL/L (ref 3–14)
BUN SERPL-MCNC: 12 MG/DL (ref 7–30)
CALCIUM SERPL-MCNC: 9 MG/DL (ref 8.5–10.1)
CHLORIDE SERPL-SCNC: 109 MMOL/L (ref 94–109)
CHOLEST SERPL-MCNC: 153 MG/DL
CO2 SERPL-SCNC: 26 MMOL/L (ref 20–32)
CREAT SERPL-MCNC: 1.03 MG/DL (ref 0.66–1.25)
GFR SERPL CREATININE-BSD FRML MDRD: 85 ML/MIN/{1.73_M2}
GLUCOSE SERPL-MCNC: 94 MG/DL (ref 70–99)
HDLC SERPL-MCNC: 45 MG/DL
LDLC SERPL CALC-MCNC: 89 MG/DL
NONHDLC SERPL-MCNC: 108 MG/DL
POTASSIUM SERPL-SCNC: 4.1 MMOL/L (ref 3.4–5.3)
SODIUM SERPL-SCNC: 141 MMOL/L (ref 133–144)
TRIGL SERPL-MCNC: 93 MG/DL

## 2019-02-27 PROCEDURE — 80048 BASIC METABOLIC PNL TOTAL CA: CPT | Performed by: INTERNAL MEDICINE

## 2019-02-27 PROCEDURE — 99213 OFFICE O/P EST LOW 20 MIN: CPT | Performed by: INTERNAL MEDICINE

## 2019-02-27 PROCEDURE — 36415 COLL VENOUS BLD VENIPUNCTURE: CPT | Performed by: INTERNAL MEDICINE

## 2019-02-27 PROCEDURE — 80061 LIPID PANEL: CPT | Performed by: INTERNAL MEDICINE

## 2019-02-27 RX ORDER — CHOLESTYRAMINE 4 G/9G
4 POWDER, FOR SUSPENSION ORAL DAILY
Qty: 378 G | Refills: 3 | Status: SHIPPED | OUTPATIENT
Start: 2019-02-27 | End: 2019-12-04

## 2019-02-27 ASSESSMENT — MIFFLIN-ST. JEOR: SCORE: 1557.32

## 2019-02-27 NOTE — PROGRESS NOTES
"  SUBJECTIVE:   Arley Roldan is a 48 year old male who presents to clinic today for the following health issues:      Medication Followup of cholestyramine    Taking Medication as prescribed: yes    Side Effects:  None    Medication Helping Symptoms:  Yes       H/o loose stool persistent since prior cholecystectomy    Sx resolved on cholestyramine       Also fasting for labwork today       Patient Active Problem List   Diagnosis     Migraine with aura and without status migrainosus, not intractable     Nephrolithiasis     Chronic diarrhea     Past Surgical History:   Procedure Laterality Date     CHOLECYSTECTOMY  2013    No known complications s/p surgery         Social History     Tobacco Use     Smoking status: Never Smoker     Smokeless tobacco: Never Used   Substance Use Topics     Alcohol use: Yes     Comment: Rare; social     Family History   Problem Relation Age of Onset     Alcoholism Brother      Pancreatic Cancer Maternal Grandmother 80     Colon Cancer Maternal Grandfather 75         Current Outpatient Medications   Medication Sig Dispense Refill     cholestyramine (QUESTRAN) 4 GM/DOSE powder Take 4 g by mouth daily Labs needed 378 g 3     SUMAtriptan (IMITREX) 5 MG/ACT nasal spray Spray 1-2 sprays in nostril as needed for migraine May repeat in 2 hours. Max 8 sprays/24 hours. (Patient not taking: Reported on 2/27/2019) 1 each 1     ZOLMitriptan (ZOMIG) 5 MG tablet Take 1 tablet (5 mg) by mouth at onset of headache for migraine May repeat in 2 hours. Max 2 tablets/24 hours. 6 tablet 3       OBJECTIVE:                                                    /80 (Cuff Size: Adult Regular)   Pulse 70   Temp 98.2  F (36.8  C) (Oral)   Ht 1.715 m (5' 7.5\")   Wt 72.6 kg (160 lb)   SpO2 98%   BMI 24.69 kg/m   Body mass index is 24.69 kg/m .  GENERAL:  alert,  no distress     ASSESSMENT/PLAN:                                                        ICD-10-CM    1. Chronic diarrhea K52.9 uncommon " complication in small percentage of pts post cholecystectomy-- possibly due to increased bile acids and laxative type effect.  Sx well controlled on cholestyramine.     Continue current rx     2. CARDIOVASCULAR SCREENING; LDL GOAL LESS THAN 160 Z13.6 Lipid panel reflex to direct LDL Fasting     Basic metabolic panel  (Ca, Cl, CO2, Creat, Gluc, K, Na, BUN)      Ian Hathaway MD  [facility]

## 2019-05-02 DIAGNOSIS — G43.109 MIGRAINE WITH AURA AND WITHOUT STATUS MIGRAINOSUS, NOT INTRACTABLE: ICD-10-CM

## 2019-05-02 NOTE — TELEPHONE ENCOUNTER
"Requested Prescriptions   Pending Prescriptions Disp Refills     ZOLMitriptan (ZOMIG) 5 MG tablet 6 tablet 3     Sig: Take 1 tablet (5 mg) by mouth at onset of headache for migraine May repeat in 2 hours. Max 2 tablets/24 hours.  Last Written Prescription Date:  08/09/2018  Last Fill Quantity: 6 tablet,  # refills: 3   Last Office Visit: 2/27/2019 Mag  Future Office Visit:            Serotonin Agonists Failed - 5/2/2019  3:23 PM        Failed - Serotonin Agonist request needs review.     Please review patient's record. If patient has had 8 or more treatments in the past month, please forward to provider.          Passed - Blood pressure under 140/90 in past 12 months     BP Readings from Last 3 Encounters:   02/27/19 118/80   09/27/18 128/82   08/09/18 118/70                 Passed - Recent (12 mo) or future (30 days) visit within the authorizing provider's specialty     Patient had office visit in the last 12 months or has a visit in the next 30 days with authorizing provider or within the authorizing provider's specialty.  See \"Patient Info\" tab in inbasket, or \"Choose Columns\" in Meds & Orders section of the refill encounter.              Passed - Medication is active on med list        Passed - Patient is age 18 or older        "

## 2019-05-03 RX ORDER — ZOLMITRIPTAN 5 MG/1
5 TABLET, FILM COATED ORAL
Qty: 6 TABLET | Refills: 3 | Status: SHIPPED | OUTPATIENT
Start: 2019-05-03 | End: 2021-09-17

## 2019-05-03 NOTE — TELEPHONE ENCOUNTER
Prescription approved per [facility], [facility] or [facility] refill protocol.  Kiah GOOD RN - Triage  [facility]

## 2019-08-08 ENCOUNTER — OFFICE VISIT (OUTPATIENT)
Dept: PEDIATRICS | Facility: CLINIC | Age: 49
End: 2019-08-08
Payer: COMMERCIAL

## 2019-08-08 VITALS
WEIGHT: 159.2 LBS | HEIGHT: 68 IN | SYSTOLIC BLOOD PRESSURE: 116 MMHG | DIASTOLIC BLOOD PRESSURE: 76 MMHG | HEART RATE: 75 BPM | TEMPERATURE: 97.1 F | OXYGEN SATURATION: 99 % | BODY MASS INDEX: 24.13 KG/M2

## 2019-08-08 DIAGNOSIS — F43.9 STRESS: ICD-10-CM

## 2019-08-08 DIAGNOSIS — G43.109 MIGRAINE WITH AURA AND WITHOUT STATUS MIGRAINOSUS, NOT INTRACTABLE: ICD-10-CM

## 2019-08-08 DIAGNOSIS — G47.00 INSOMNIA, UNSPECIFIED TYPE: Primary | ICD-10-CM

## 2019-08-08 PROCEDURE — 99214 OFFICE O/P EST MOD 30 MIN: CPT | Performed by: NURSE PRACTITIONER

## 2019-08-08 RX ORDER — TRAZODONE HYDROCHLORIDE 50 MG/1
50-100 TABLET, FILM COATED ORAL AT BEDTIME
Qty: 180 TABLET | Refills: 1 | Status: SHIPPED | OUTPATIENT
Start: 2019-08-08 | End: 2021-09-17

## 2019-08-08 ASSESSMENT — MIFFLIN-ST. JEOR: SCORE: 1553.69

## 2019-08-08 NOTE — PROGRESS NOTES
"Subjective     Arley Roldan is a 48 year old male who presents to clinic today for the following health issues:    HPI   Medication Followup of ***    Taking Medication as prescribed: {.:872906::\"yes\"}    Side Effects:  {NONEORCHOOSE:672656::\"None\"}    Medication Helping Symptoms:  {.:061386::\"yes\"}     {additonal problems for provider to add (Optional):133811}    {HIST REVIEW/ LINKS 2 (Optional):968001}    Reviewed and updated as needed this visit by Provider         Review of Systems   {ROS COMP (Optional):956585}      Objective    There were no vitals taken for this visit.  There is no height or weight on file to calculate BMI.  Physical Exam   {Exam List (Optional):284103}    {Diagnostic Test Results (Optional):469821::\"Diagnostic Test Results:\",\"Labs reviewed in Epic\"}        {PROVIDER CHARTING PREFERENCE:060269}    "

## 2019-08-08 NOTE — PROGRESS NOTES
Subjective     Arley Roldan is a 48 year old male who presents to clinic today for the following health issues:    HPI   Migraine     Since your last clinic visit, how have your headaches changed?  Worsened    How often are you getting headaches or migraines? 1-2 x per week     Are you able to do normal daily activities when you have a migraine? Yes    Are you taking rescue/relief medications? (Select all that apply) Zomig    How helpful is your rescue/relief medication?  I get total relief but gets really tired    Are you taking any medications to prevent migraines? (Select all that apply)  Other: was taking depokote in past, affected mood too much    In the past 4 weeks, how often have you gone to urgent care or the emergency room because of your headaches?  0      Amount of exercise or physical activity: 1 day/week for an average of 30-60 minutes    Problems taking medications regularly: No    Medication side effects: none    Diet: regular (no restrictions)    Work has been very stressful. Has been having a hard time sleeping. Has had mild depression in the past and hasn't needed meds. Recently started seeing a therapist. No SI/HI.    Abnormal Mood Symptoms  Onset: 3 months    Description:   Depression: YES  Anxiety: YES    Accompanying Signs & Symptoms:  Still participating in activities that you used to enjoy: somewhat  Fatigue: YES  Irritability: YES  Difficulty concentrating: YES  Changes in appetite: YES  Problems with sleep: YES  Heart racing/beating fast : no   Thoughts of hurting yourself or others: none    History:   Recent stress: YES- job  Prior depression hospitalization: None  Family history of depression: YES  Family history of anxiety: no    Precipitating factors:   Alcohol/drug use: YES- no drugs, casual beers    Alleviating factors:  Started counselor couple weeks ago    Therapies Tried and outcome: None    ROS: const/neuro/psych otherwise negative     OBJECTIVE:  /76 (Cuff Size: Adult  "Regular)   Pulse 75   Temp 97.1  F (36.2  C) (Tympanic)   Ht 1.715 m (5' 7.5\")   Wt 72.2 kg (159 lb 3.2 oz)   SpO2 99%   BMI 24.57 kg/m    CONSTITUTIONAL: Alert, well-nourished, well-groomed, NAD  RESP: Lungs CTA. No wheeze, rhonchi, rales.  CV: HRRR S1 S2 No MRG. No peripheral edema  PSYCH: Bright affect. Appropriate mentation and speech.   NEURO: CN II-XII grossly intact. No nystagmus. Speech and mentation appropriate. Normal gait. Romberg negative.       ASSESSMENT/PLAN:  (G47.00) Insomnia, unspecified type  (primary encounter diagnosis)  Comment: Due to stress. Possible diagnosis of depression.   Plan: traZODone (DESYREL) 50 MG tablet  -Declines to start an serotonin specific reuptake inhibitor  -Start Trazodone for sleep  -Discussed sleep hygiene  -Discussed mindfulness, exercise, relaxation  -COntinue therapy.   -F/U 1 month. May need to start SSRI    (F43.9) Stress      (G43.109) Migraine with aura and without status migrainosus, not intractable  Comment: Increase in frequency. No red flag sx. He thinks stress is contributing.   Plan: NEUROLOGY ADULT REFERRAL          -Ok to continue abortive therapies  -Schedule with neuro  -Sleep/stress management  -Discussed supportive cares and reasons to return. Discussed reasons to seek care urgently.         Dalila Chan, FNP-DNP.        "

## 2019-10-22 ENCOUNTER — OFFICE VISIT (OUTPATIENT)
Dept: PEDIATRICS | Facility: CLINIC | Age: 49
End: 2019-10-22
Payer: COMMERCIAL

## 2019-10-22 VITALS
BODY MASS INDEX: 25 KG/M2 | RESPIRATION RATE: 16 BRPM | TEMPERATURE: 98.2 F | WEIGHT: 162 LBS | SYSTOLIC BLOOD PRESSURE: 118 MMHG | DIASTOLIC BLOOD PRESSURE: 80 MMHG | OXYGEN SATURATION: 100 % | HEART RATE: 77 BPM

## 2019-10-22 DIAGNOSIS — K52.9 CHRONIC DIARRHEA: ICD-10-CM

## 2019-10-22 DIAGNOSIS — Z23 NEED FOR PROPHYLACTIC VACCINATION AND INOCULATION AGAINST INFLUENZA: ICD-10-CM

## 2019-10-22 DIAGNOSIS — G43.109 MIGRAINE WITH AURA AND WITHOUT STATUS MIGRAINOSUS, NOT INTRACTABLE: Primary | ICD-10-CM

## 2019-10-22 DIAGNOSIS — F51.04 CHRONIC INSOMNIA: ICD-10-CM

## 2019-10-22 PROCEDURE — 90471 IMMUNIZATION ADMIN: CPT | Performed by: INTERNAL MEDICINE

## 2019-10-22 PROCEDURE — 90686 IIV4 VACC NO PRSV 0.5 ML IM: CPT | Performed by: INTERNAL MEDICINE

## 2019-10-22 PROCEDURE — 99214 OFFICE O/P EST MOD 30 MIN: CPT | Mod: 25 | Performed by: INTERNAL MEDICINE

## 2019-10-22 RX ORDER — ZOLMITRIPTAN 5 MG/1
TABLET, ORALLY DISINTEGRATING ORAL
Qty: 6 TABLET | Refills: 11 | Status: SHIPPED | OUTPATIENT
Start: 2019-10-22 | End: 2021-09-17

## 2019-10-22 NOTE — PROGRESS NOTES
SUBJECTIVE:                                                    Arley Roldan is a 48 year old male who presents to clinic today for the following health issues:    Migraine with aura and without status migrainosus, not intractable        Migraine follow-up.  Current regimen: Zomig as needed.  Estimates he uses Zomig approximately twice per month.  Current regimen is effective.  Migraines often triggered by muscular tension in the neck.       Chronic insomnia     history of chronic insomnia.  Continues trazodone  mg each evening as needed.  Continues to work on sleep hygiene.  Denies medication side effects.    Chronic diarrhea    history of chronic diarrhea following cholecystectomy.  Symptoms have resolved on cholestyramine.    Requests flu shot today.    Patient Active Problem List   Diagnosis     Migraine with aura and without status migrainosus, not intractable     Nephrolithiasis     Chronic diarrhea     Past Surgical History:   Procedure Laterality Date     CHOLECYSTECTOMY  2013    No known complications s/p surgery         Social History     Tobacco Use     Smoking status: Never Smoker     Smokeless tobacco: Never Used   Substance Use Topics     Alcohol use: Yes     Comment: Rare; social     Family History   Problem Relation Age of Onset     Alcoholism Brother      Pancreatic Cancer Maternal Grandmother 80     Colon Cancer Maternal Grandfather 75         Current Outpatient Medications   Medication Sig Dispense Refill     cholestyramine (QUESTRAN) 4 GM/DOSE powder Take 4 g by mouth daily Labs needed 378 g 3     SUMAtriptan (IMITREX) 5 MG/ACT nasal spray Spray 1-2 sprays in nostril as needed for migraine May repeat in 2 hours. Max 8 sprays/24 hours. 1 each 1     traZODone (DESYREL) 50 MG tablet Take 1-2 tablets ( mg) by mouth At Bedtime 180 tablet 1     ZOLMitriptan (ZOMIG) 5 MG tablet Take 1 tablet (5 mg) by mouth at onset of headache for migraine May repeat in 2 hours. Max 2 tablets/24 hours. 6  tablet 3     ZOLMitriptan (ZOMIG-ZMT) 5 MG ODT DISSOLVE 1 TABLET ON THE TONGUE AND SWALLOW AT THE ONSET OF MIGRAINE, MAY REPEAT ONCE AFTER 2 HOURS. MAX 2 TABLETS PER DAY 6 tablet 11       ROS: The following systems have been completely reviewed and are negative except as noted in the HPI: CONSTITUTIONAL, CARDIOVASCULAR, PULMONARY, NEUROLOGIC    OBJECTIVE:                                                    /80 (Cuff Size: Adult Regular)   Pulse 77   Temp 98.2  F (36.8  C) (Oral)   Resp 16   Wt 73.5 kg (162 lb)   SpO2 100%   BMI 25.00 kg/m   Body mass index is 25 kg/m .  GENERAL:  alert,  no distress  NECK: no tenderness, no adenopathy  RESP: lungs clear to auscultation - no rales, no rhonchi, no wheezes  CV: regular rates and rhythm, normal S1 S2, no S3 or S4 and no murmur, no click or rub  MS: extremities- no edema       ASSESSMENT/PLAN:                                                        ICD-10-CM    1. Migraine with aura and without status migrainosus, not intractable G43.109 ZOLMitriptan (ZOMIG-ZMT) 5 MG ODT       Continue current regimen.  Discussed criteria for adding migraine prophylaxis.     2. Chronic insomnia F51.04  continue trazodone as needed.  Reviewed sleep hygiene.     3. Chronic diarrhea K52.9  symptoms well controlled, continue cholestyramine.     4. Need for prophylactic vaccination and inoculation against influenza Z23 INFLUENZA VACCINE IM > 6 MONTHS VALENT IIV4 [96870]     Vaccine Administration, Initial [[id number]]      Ian Hathaway MD  [facility]

## 2019-12-04 DIAGNOSIS — K52.9 CHRONIC DIARRHEA: ICD-10-CM

## 2019-12-04 RX ORDER — CHOLESTYRAMINE 4 G/9G
POWDER, FOR SUSPENSION ORAL
Qty: 378 G | Refills: 0 | Status: SHIPPED | OUTPATIENT
Start: 2019-12-04 | End: 2020-04-07

## 2019-12-04 NOTE — TELEPHONE ENCOUNTER
"Requested Prescriptions   Pending Prescriptions Disp Refills     cholestyramine (QUESTRAN) 4 GM/DOSE powder [Pharmacy Med Name: CHOLESTYRAMINE POWDER (CAN) 378GM] 378 g 0     Sig: TAKE 4GM BY MOUTH DAILY.       Bile Acid Sequestrant Agents Passed - 12/4/2019  8:00 AM        Passed - Lipid panel on file in past 12 mos     Recent Labs   Lab Test 02/27/19  0756   CHOL 153   TRIG 93   HDL 45   LDL 89   NHDL 108               Passed - Recent (12 mo) or future (30 days) visit within the authorizing provider's specialty     Patient has had an office visit with the authorizing provider or a provider within the authorizing providers department within the previous 12 mos or has a future within next 30 days. See \"Patient Info\" tab in inbasket, or \"Choose Columns\" in Meds & Orders section of the refill encounter.              Passed - Medication is active on med list        Passed - Patient is age 18 years or older          "

## 2020-03-11 ENCOUNTER — HEALTH MAINTENANCE LETTER (OUTPATIENT)
Age: 50
End: 2020-03-11

## 2020-04-04 DIAGNOSIS — K52.9 CHRONIC DIARRHEA: ICD-10-CM

## 2020-04-07 RX ORDER — CHOLESTYRAMINE 4 G/9G
POWDER, FOR SUSPENSION ORAL
Qty: 378 G | Refills: 0 | Status: SHIPPED | OUTPATIENT
Start: 2020-04-07 | End: 2020-07-13

## 2020-04-07 NOTE — TELEPHONE ENCOUNTER
Prescription refilled x 1 per [facility] Refill Protocol.    Routing to MA/TC Team, please set up a fasting annual office visit when it's appropriate.

## 2020-04-09 NOTE — TELEPHONE ENCOUNTER
Left message rx was refilled one time. Advised him to call clinic to schedule a physical later in the summer.  KINGA Bonilla

## 2020-07-12 DIAGNOSIS — K52.9 CHRONIC DIARRHEA: ICD-10-CM

## 2020-07-13 RX ORDER — CHOLESTYRAMINE 4 G/9G
POWDER, FOR SUSPENSION ORAL
Qty: 378 G | Refills: 3 | Status: SHIPPED | OUTPATIENT
Start: 2020-07-13 | End: 2021-06-16

## 2020-07-13 NOTE — TELEPHONE ENCOUNTER
Routing refill request to provider for review/approval because:  Labs out of range:  Needs LDL yearly     Viri Bran, RN

## 2021-01-03 ENCOUNTER — HEALTH MAINTENANCE LETTER (OUTPATIENT)
Age: 51
End: 2021-01-03

## 2021-04-25 ENCOUNTER — HEALTH MAINTENANCE LETTER (OUTPATIENT)
Age: 51
End: 2021-04-25

## 2021-06-16 DIAGNOSIS — K52.9 CHRONIC DIARRHEA: ICD-10-CM

## 2021-06-17 RX ORDER — CHOLESTYRAMINE 4 G/9G
POWDER, FOR SUSPENSION ORAL
Qty: 378 G | Refills: 1 | Status: SHIPPED | OUTPATIENT
Start: 2021-06-17 | End: 2022-02-25

## 2021-06-17 NOTE — TELEPHONE ENCOUNTER
Called and spoke to the patient. He stated that he is in [place] since September.  He stated that he plans to return to [place] this fall but doesn't know when yet.  The patient went on to say that he would call and schedule an appointment with Dr. Hathaway as soon as he knows when he will be back in [place].     Felicita Montana    June 17, 2021 at 11:04 AM

## 2021-06-17 NOTE — TELEPHONE ENCOUNTER
3 month keyur refill approved.     MA/TC: Please assist patient in scheduling an office visit.     Tenisha Scott RN on 6/17/2021 at 10:35 AM

## 2021-09-17 ENCOUNTER — OFFICE VISIT (OUTPATIENT)
Dept: PEDIATRICS | Facility: CLINIC | Age: 51
End: 2021-09-17
Payer: COMMERCIAL

## 2021-09-17 VITALS
OXYGEN SATURATION: 99 % | HEART RATE: 72 BPM | BODY MASS INDEX: 27.47 KG/M2 | SYSTOLIC BLOOD PRESSURE: 120 MMHG | RESPIRATION RATE: 18 BRPM | WEIGHT: 175 LBS | DIASTOLIC BLOOD PRESSURE: 82 MMHG | TEMPERATURE: 97.3 F | HEIGHT: 67 IN

## 2021-09-17 DIAGNOSIS — Z00.00 ROUTINE GENERAL MEDICAL EXAMINATION AT A HEALTH CARE FACILITY: Primary | ICD-10-CM

## 2021-09-17 DIAGNOSIS — K52.9 CHRONIC DIARRHEA: ICD-10-CM

## 2021-09-17 DIAGNOSIS — G43.109 MIGRAINE WITH AURA AND WITHOUT STATUS MIGRAINOSUS, NOT INTRACTABLE: ICD-10-CM

## 2021-09-17 PROCEDURE — 80053 COMPREHEN METABOLIC PANEL: CPT | Performed by: INTERNAL MEDICINE

## 2021-09-17 PROCEDURE — 80061 LIPID PANEL: CPT | Performed by: INTERNAL MEDICINE

## 2021-09-17 PROCEDURE — 99396 PREV VISIT EST AGE 40-64: CPT | Performed by: INTERNAL MEDICINE

## 2021-09-17 PROCEDURE — 36415 COLL VENOUS BLD VENIPUNCTURE: CPT | Performed by: INTERNAL MEDICINE

## 2021-09-17 RX ORDER — ZOLMITRIPTAN 5 MG/1
TABLET, ORALLY DISINTEGRATING ORAL
Qty: 6 TABLET | Refills: 11 | Status: SHIPPED | OUTPATIENT
Start: 2021-09-17 | End: 2022-10-01

## 2021-09-17 RX ORDER — VERAPAMIL HYDROCHLORIDE 120 MG/1
120 TABLET, FILM COATED, EXTENDED RELEASE ORAL AT BEDTIME
Qty: 30 TABLET | Refills: 2 | Status: ON HOLD | OUTPATIENT
Start: 2021-09-17 | End: 2022-08-09

## 2021-09-17 ASSESSMENT — ENCOUNTER SYMPTOMS
DIZZINESS: 0
FREQUENCY: 0
DIARRHEA: 0
WEAKNESS: 0
MYALGIAS: 0
HEMATOCHEZIA: 0
PALPITATIONS: 0
SORE THROAT: 0
DYSURIA: 0
HEMATURIA: 0
HEARTBURN: 1
PARESTHESIAS: 0
COUGH: 1
JOINT SWELLING: 0
ABDOMINAL PAIN: 0
CONSTIPATION: 0
HEADACHES: 1
FEVER: 0
NERVOUS/ANXIOUS: 0
CHILLS: 0
SHORTNESS OF BREATH: 0
EYE PAIN: 0
NAUSEA: 0
ARTHRALGIAS: 0

## 2021-09-17 ASSESSMENT — MIFFLIN-ST. JEOR: SCORE: 1607.42

## 2021-09-17 NOTE — PROGRESS NOTES
"SUBJECTIVE:   CC: Arley Roldan is an 50 year old male who presents for preventative health visit.       Patient has been advised of split billing requirements and indicates understanding: Yes  Healthy Habits:     Getting at least 3 servings of Calcium per day:  NO    Bi-annual eye exam:  NO    Dental care twice a year:  NO    Sleep apnea or symptoms of sleep apnea:  Daytime drowsiness and Excessive snoring    Diet:  Regular (no restrictions)    Frequency of exercise:  None    Taking medications regularly:  No    Medication side effects:  None    PHQ-2 Total Score: 0    Additional concerns today:  No          Cough       Duration: 3 weeks    Description (location/character/radiation): non productive cough     Intensity:  mild, moderate    Accompanying signs and symptoms: none    History (similar episodes/previous evaluation): yes, patient has had cough for 5-7 years     Precipitating or alleviating factors: None    Therapies tried and outcome: None       Evaluation in [facility].  Cough lingers for months.  Dry and hacking;  Sounds horrible but \"is really not.\"  Had a CT of lungs and noted some scarring thought to be from pneumonia. Has come back about 3 weeks ago, thinks from allergies. IMproves when he is in [place], but worse in his apt in [place].     Not a lot of exercise.  Schedule precludes. Doing weekend classes, and works during the week. Is an .  Prints food packaging.  Studying business admin.     Migraines; takes zomig, but interested in preventive meds.  Depakote was \"making him mean.\"  Topamax did not help.  Has been out of zomig for 1.5 years.  Gets headaches about once every 2 weeks or so.      Today's PHQ-2 Score:   PHQ-2 ( 1999 Pfizer) 9/17/2021   Q1: Little interest or pleasure in doing things 0   Q2: Feeling down, depressed or hopeless 0   PHQ-2 Score 0   Q1: Little interest or pleasure in doing things Not at all   Q2: Feeling down, depressed or hopeless Not at all   PHQ-2 Score 0 "       Abuse: Current or Past(Physical, Sexual or Emotional)- No  Do you feel safe in your environment? Yes    Have you ever done Advance Care Planning? (For example, a Health Directive, POLST, or a discussion with a medical provider or your loved ones about your wishes): No, advance care planning information given to patient to review.  Patient plans to discuss their wishes with loved ones or provider.      Social History     Tobacco Use     Smoking status: Never Smoker     Smokeless tobacco: Never Used   Substance Use Topics     Alcohol use: Yes     Comment: Rare; social         Alcohol Use 9/17/2021   Prescreen: >3 drinks/day or >7 drinks/week? No   Prescreen: >3 drinks/day or >7 drinks/week? -       Last PSA: No results found for: PSA    Reviewed orders with patient. Reviewed health maintenance and updated orders accordingly - Yes      Reviewed and updated as needed this visit by clinical staff  Tobacco  Allergies  Meds   Med Hx  Surg Hx  Fam Hx  Soc Hx        Reviewed and updated as needed this visit by Provider                  Past Medical History:   Diagnosis Date     Migraines      Nephrolithiasis       Past Surgical History:   Procedure Laterality Date     CHOLECYSTECTOMY  2013    No known complications s/p surgery         Review of Systems   Constitutional: Negative for chills and fever.   HENT: Negative for congestion, ear pain, hearing loss and sore throat.    Eyes: Positive for visual disturbance. Negative for pain.   Respiratory: Positive for cough. Negative for shortness of breath.    Cardiovascular: Negative for chest pain, palpitations and peripheral edema.   Gastrointestinal: Positive for heartburn. Negative for abdominal pain, constipation, diarrhea, hematochezia and nausea.   Genitourinary: Negative for discharge, dysuria, frequency, genital sores, hematuria, impotence and urgency.   Musculoskeletal: Negative for arthralgias, joint swelling and myalgias.   Skin: Negative for rash.  "  Neurological: Positive for headaches. Negative for dizziness, weakness and paresthesias.   Psychiatric/Behavioral: Negative for mood changes. The patient is not nervous/anxious.      CONSTITUTIONAL: NEGATIVE for fever, chills, change in weight  INTEGUMENTARY/SKIN: NEGATIVE for worrisome rashes, moles or lesions  EYES: NEGATIVE for vision changes or irritation  ENT: NEGATIVE for ear, mouth and throat problems  RESP: NEGATIVE for significant cough or SOB  CV: NEGATIVE for chest pain, palpitations or peripheral edema  GI: NEGATIVE for nausea, abdominal pain, heartburn, or change in bowel habits   male: negative for dysuria, hematuria, decreased urinary stream, erectile dysfunction, urethral discharge  MUSCULOSKELETAL: NEGATIVE for significant arthralgias or myalgia  NEURO: NEGATIVE for weakness, dizziness or paresthesias  PSYCHIATRIC: NEGATIVE for changes in mood or affect    OBJECTIVE:   /82 (BP Location: Right arm, Patient Position: Sitting, Cuff Size: Adult Regular)   Pulse 72   Temp 97.3  F (36.3  C) (Tympanic)   Resp 18   Ht 1.702 m (5' 7\")   Wt 79.4 kg (175 lb)   SpO2 99%   BMI 27.41 kg/m      Physical Exam  GENERAL: healthy, alert and no distress  EYES: Eyes grossly normal to inspection, PERRL and conjunctivae and sclerae normal  HENT: ear canals and TM's normal, nose and mouth without ulcers or lesions  NECK: no adenopathy, no asymmetry, masses, or scars and thyroid normal to palpation  RESP: lungs clear to auscultation - no rales, rhonchi or wheezes  CV: regular rate and rhythm, normal S1 S2, no S3 or S4, no murmur, click or rub, no peripheral edema and peripheral pulses strong  ABDOMEN: soft, nontender, no hepatosplenomegaly, no masses and bowel sounds normal  MS: no gross musculoskeletal defects noted, no edema  SKIN: no suspicious lesions or rashes  NEURO: Normal strength and tone, mentation intact and speech normal  PSYCH: mentation appears normal, affect normal/bright    Diagnostic Test " "Results:  Labs reviewed in Epic    ASSESSMENT/PLAN:   (Z00.00) Routine general medical examination at a health care facility  (primary encounter diagnosis)  Comment:   Plan: Lipid panel reflex to direct LDL Fasting,         Comprehensive metabolic panel (BMP + Alb, Alk         Phos, ALT, AST, Total. Bili, TP)        Discussed diet, exercise, testicular self exam, blood pressure, cholesterol, and need for cancer surveillance at appropriate ages.     (G43.109) Migraine with aura and without status migrainosus, not intractable  Comment:   Plan: verapamil ER (CALAN-SR) 120 MG CR tablet,         ZOLMitriptan (ZOMIG-ZMT) 5 MG ODT        Trial of verapamil.  Follow up in 2- 3mos.  Watch for light headedness.     (K52.9) Chronic diarrhea  Comment:   Plan: continue cholestyramine.     Patient has been advised of split billing requirements and indicates understanding: Yes  COUNSELING:   Reviewed preventive health counseling, as reflected in patient instructions       Regular exercise       Healthy diet/nutrition       Vision screening       Hearing screening       Colon cancer screening       Prostate cancer screening    Estimated body mass index is 27.41 kg/m  as calculated from the following:    Height as of this encounter: 1.702 m (5' 7\").    Weight as of this encounter: 79.4 kg (175 lb).     Weight management plan: Patient was referred to their PCP to discuss a diet and exercise plan.    He reports that he has never smoked. He has never used smokeless tobacco.      Counseling Resources:  ATP IV Guidelines  Pooled Cohorts Equation Calculator  FRAX Risk Assessment  ICSI Preventive Guidelines  Dietary Guidelines for Americans, 2010  USDA's MyPlate  ASA Prophylaxis  Lung CA Screening    Arley Hameed MD  [facility]  "

## 2021-09-17 NOTE — PATIENT INSTRUCTIONS
"Get your shingles vaccine (\"Shingrix\") at our pharmacy downstairs (or at another pharmacy), sometime this year--even if you've already received the old \"Zostavax\" shingles vaccine.  The \"Shingrix\" has better immunity and lasts longer, and is cheaper if you get it directly at a pharmacy.  You should not need an appointment.     You will need a second Shingrix anywhere from 2-6 months later.    Consider flu shot.    Lab work today:  We can do labs in the exam room today    Consider colonscopy next year.      With respect to your migraines, we'll try preventive verapamil for 1-3 months.  Keep track of the number of Migraines you have.    Refilled the zomig for as needed use.    Arley Hameed MD  Internal Medicine and Pediatrics       Preventive Health Recommendations  Male Ages 50 - 64    Yearly exam:             See your health care provider every year in order to  o   Review health changes.   o   Discuss preventive care.    o   Review your medicines if your doctor has prescribed any.     Have a cholesterol test every 5 years, or more frequently if you are at risk for high cholesterol/heart disease.     Have a diabetes test (fasting glucose) every three years. If you are at risk for diabetes, you should have this test more often.     Have a colonoscopy at age 50, or have a yearly FIT test (stool test). These exams will check for colon cancer.      Talk with your health care provider about whether or not a prostate cancer screening test (PSA) is right for you.    You should be tested each year for STDs (sexually transmitted diseases), if you re at risk.     Shots: Get a flu shot each year. Get a tetanus shot every 10 years.     Nutrition:    Eat at least 5 servings of fruits and vegetables daily.     Eat whole-grain bread, whole-wheat pasta and brown rice instead of white grains and rice.     Get adequate Calcium and Vitamin D.     Lifestyle    Exercise for at least 150 minutes a week (30 minutes a day, 5 days a week). " This will help you control your weight and prevent disease.     Limit alcohol to one drink per day.     No smoking.     Wear sunscreen to prevent skin cancer.     See your dentist every six months for an exam and cleaning.     See your eye doctor every 1 to 2 years.

## 2021-09-18 LAB
ALBUMIN SERPL-MCNC: 3.8 G/DL (ref 3.4–5)
ALP SERPL-CCNC: 79 U/L (ref 40–150)
ALT SERPL W P-5'-P-CCNC: 25 U/L (ref 0–70)
ANION GAP SERPL CALCULATED.3IONS-SCNC: 3 MMOL/L (ref 3–14)
AST SERPL W P-5'-P-CCNC: 19 U/L (ref 0–45)
BILIRUB SERPL-MCNC: 0.6 MG/DL (ref 0.2–1.3)
BUN SERPL-MCNC: 11 MG/DL (ref 7–30)
CALCIUM SERPL-MCNC: 8.2 MG/DL (ref 8.5–10.1)
CHLORIDE BLD-SCNC: 109 MMOL/L (ref 94–109)
CHOLEST SERPL-MCNC: 178 MG/DL
CO2 SERPL-SCNC: 27 MMOL/L (ref 20–32)
CREAT SERPL-MCNC: 0.98 MG/DL (ref 0.66–1.25)
FASTING STATUS PATIENT QL REPORTED: YES
GFR SERPL CREATININE-BSD FRML MDRD: 89 ML/MIN/1.73M2
GLUCOSE BLD-MCNC: 77 MG/DL (ref 70–99)
HDLC SERPL-MCNC: 52 MG/DL
LDLC SERPL CALC-MCNC: 103 MG/DL
NONHDLC SERPL-MCNC: 126 MG/DL
POTASSIUM BLD-SCNC: 4 MMOL/L (ref 3.4–5.3)
PROT SERPL-MCNC: 7.3 G/DL (ref 6.8–8.8)
SODIUM SERPL-SCNC: 139 MMOL/L (ref 133–144)
TRIGL SERPL-MCNC: 115 MG/DL

## 2021-10-10 ENCOUNTER — HEALTH MAINTENANCE LETTER (OUTPATIENT)
Age: 51
End: 2021-10-10

## 2021-12-04 ENCOUNTER — HEALTH MAINTENANCE LETTER (OUTPATIENT)
Age: 51
End: 2021-12-04

## 2022-02-25 ENCOUNTER — TELEPHONE (OUTPATIENT)
Dept: PEDIATRICS | Facility: CLINIC | Age: 52
End: 2022-02-25
Payer: COMMERCIAL

## 2022-02-25 DIAGNOSIS — K52.9 CHRONIC DIARRHEA: ICD-10-CM

## 2022-02-25 RX ORDER — CHOLESTYRAMINE 4 G/9G
POWDER, FOR SUSPENSION ORAL
Qty: 378 G | Refills: 1 | Status: SHIPPED | OUTPATIENT
Start: 2022-02-25 | End: 2022-02-25

## 2022-02-25 RX ORDER — CHOLESTYRAMINE 4 G/9G
POWDER, FOR SUSPENSION ORAL
Qty: 378 G | Refills: 1 | Status: SHIPPED | OUTPATIENT
Start: 2022-02-25 | End: 2022-07-20

## 2022-02-25 NOTE — TELEPHONE ENCOUNTER
Patient called stating he is heading home this weekend and needs Rx transferred to the [facility].  Patient states that [facility] is currently having system issues, and is unable to transfer Rx electronically.  Routing to refill team for transfer.     Rosa Vyas  Lead

## 2022-02-25 NOTE — TELEPHONE ENCOUNTER
Prescription approved per [facility] Refill Protocol.    Mariely Hassan, RN   [facility]  -- Triage Nurse

## 2022-02-25 NOTE — TELEPHONE ENCOUNTER
Alternative pharmacy requested. Prescription approved per [facility] Refill Protocol.  Thuan LERMA RN

## 2022-07-16 DIAGNOSIS — K52.9 CHRONIC DIARRHEA: ICD-10-CM

## 2022-07-20 RX ORDER — CHOLESTYRAMINE 4 G/9G
POWDER, FOR SUSPENSION ORAL
Qty: 378 G | Refills: 0 | Status: SHIPPED | OUTPATIENT
Start: 2022-07-20 | End: 2022-12-21

## 2022-07-23 ENCOUNTER — TELEPHONE (OUTPATIENT)
Dept: PEDIATRICS | Facility: CLINIC | Age: 52
End: 2022-07-23

## 2022-07-23 DIAGNOSIS — Z12.11 SCREENING FOR COLON CANCER: Primary | ICD-10-CM

## 2022-07-23 NOTE — TELEPHONE ENCOUNTER
Reason for call:  Other   Patient called regarding (reason for call): pt is looking to get coloscopy scheduled    Additional comments: reach out to pt regarding this matter asap     Phone number to reach patient:  Cell number on file:    Telephone Information:   Mobile [phone]       Best Time: after 10am -4pm is better for pt    anytime    Can we leave a detailed message on this number?  YES    Travel screening: Not Applicable

## 2022-07-28 RX ORDER — BISACODYL 5 MG
TABLET, DELAYED RELEASE (ENTERIC COATED) ORAL
Qty: 4 TABLET | Refills: 0 | Status: SHIPPED | OUTPATIENT
Start: 2022-07-28 | End: 2023-04-05

## 2022-08-09 ENCOUNTER — HOSPITAL ENCOUNTER (OUTPATIENT)
Facility: CLINIC | Age: 52
Discharge: HOME | End: 2022-08-09
Attending: INTERNAL MEDICINE | Admitting: INTERNAL MEDICINE
Payer: COMMERCIAL

## 2022-08-09 VITALS
RESPIRATION RATE: 14 BRPM | BODY MASS INDEX: 25.46 KG/M2 | OXYGEN SATURATION: 96 % | SYSTOLIC BLOOD PRESSURE: 130 MMHG | DIASTOLIC BLOOD PRESSURE: 75 MMHG | HEIGHT: 68 IN | TEMPERATURE: 97.7 F | WEIGHT: 168 LBS | HEART RATE: 77 BPM

## 2022-08-09 DIAGNOSIS — Z12.11 SPECIAL SCREENING FOR MALIGNANT NEOPLASMS, COLON: Primary | ICD-10-CM

## 2022-08-09 LAB — COLONOSCOPY: NORMAL

## 2022-08-09 PROCEDURE — G0121 COLON CA SCRN NOT HI RSK IND: HCPCS | Performed by: INTERNAL MEDICINE

## 2022-08-09 PROCEDURE — G0500 MOD SEDAT ENDO SERVICE >5YRS: HCPCS | Performed by: INTERNAL MEDICINE

## 2022-08-09 PROCEDURE — 250N000011 HC RX IP 250 OP 636: Performed by: INTERNAL MEDICINE

## 2022-08-09 PROCEDURE — 45378 DIAGNOSTIC COLONOSCOPY: CPT | Performed by: INTERNAL MEDICINE

## 2022-08-09 RX ORDER — FENTANYL CITRATE 0.05 MG/ML
50-100 INJECTION, SOLUTION INTRAMUSCULAR; INTRAVENOUS EVERY 5 MIN PRN
Status: DISCONTINUED | OUTPATIENT
Start: 2022-08-09 | End: 2022-08-09 | Stop reason: HOSPADM

## 2022-08-09 RX ORDER — ONDANSETRON 4 MG/1
4 TABLET, ORALLY DISINTEGRATING ORAL EVERY 6 HOURS PRN
Status: DISCONTINUED | OUTPATIENT
Start: 2022-08-09 | End: 2022-08-09 | Stop reason: HOSPADM

## 2022-08-09 RX ORDER — PROCHLORPERAZINE MALEATE 10 MG
10 TABLET ORAL EVERY 6 HOURS PRN
Status: DISCONTINUED | OUTPATIENT
Start: 2022-08-09 | End: 2022-08-09 | Stop reason: HOSPADM

## 2022-08-09 RX ORDER — FLUMAZENIL 0.1 MG/ML
0.2 INJECTION, SOLUTION INTRAVENOUS
Status: DISCONTINUED | OUTPATIENT
Start: 2022-08-09 | End: 2022-08-09 | Stop reason: HOSPADM

## 2022-08-09 RX ORDER — ONDANSETRON 2 MG/ML
4 INJECTION INTRAMUSCULAR; INTRAVENOUS EVERY 6 HOURS PRN
Status: DISCONTINUED | OUTPATIENT
Start: 2022-08-09 | End: 2022-08-09 | Stop reason: HOSPADM

## 2022-08-09 RX ORDER — NALOXONE HYDROCHLORIDE 0.4 MG/ML
0.2 INJECTION, SOLUTION INTRAMUSCULAR; INTRAVENOUS; SUBCUTANEOUS
Status: DISCONTINUED | OUTPATIENT
Start: 2022-08-09 | End: 2022-08-09 | Stop reason: HOSPADM

## 2022-08-09 RX ORDER — EPINEPHRINE 1 MG/ML
0.1 INJECTION, SOLUTION INTRAMUSCULAR; SUBCUTANEOUS
Status: DISCONTINUED | OUTPATIENT
Start: 2022-08-09 | End: 2022-08-09 | Stop reason: HOSPADM

## 2022-08-09 RX ORDER — NALOXONE HYDROCHLORIDE 0.4 MG/ML
0.4 INJECTION, SOLUTION INTRAMUSCULAR; INTRAVENOUS; SUBCUTANEOUS
Status: DISCONTINUED | OUTPATIENT
Start: 2022-08-09 | End: 2022-08-09 | Stop reason: HOSPADM

## 2022-08-09 RX ORDER — LIDOCAINE 40 MG/G
CREAM TOPICAL
Status: DISCONTINUED | OUTPATIENT
Start: 2022-08-09 | End: 2022-08-09 | Stop reason: HOSPADM

## 2022-08-09 RX ORDER — ATROPINE SULFATE 0.1 MG/ML
1 INJECTION INTRAVENOUS
Status: DISCONTINUED | OUTPATIENT
Start: 2022-08-09 | End: 2022-08-09 | Stop reason: HOSPADM

## 2022-08-09 RX ORDER — DIPHENHYDRAMINE HYDROCHLORIDE 50 MG/ML
25-50 INJECTION INTRAMUSCULAR; INTRAVENOUS
Status: DISCONTINUED | OUTPATIENT
Start: 2022-08-09 | End: 2022-08-09 | Stop reason: HOSPADM

## 2022-08-09 RX ORDER — ONDANSETRON 2 MG/ML
4 INJECTION INTRAMUSCULAR; INTRAVENOUS
Status: DISCONTINUED | OUTPATIENT
Start: 2022-08-09 | End: 2022-08-09 | Stop reason: HOSPADM

## 2022-08-09 RX ORDER — SIMETHICONE 40MG/0.6ML
133 SUSPENSION, DROPS(FINAL DOSAGE FORM)(ML) ORAL
Status: DISCONTINUED | OUTPATIENT
Start: 2022-08-09 | End: 2022-08-09 | Stop reason: HOSPADM

## 2022-08-09 RX ADMIN — MIDAZOLAM HYDROCHLORIDE 2 MG: 1 INJECTION, SOLUTION INTRAMUSCULAR; INTRAVENOUS at 13:04

## 2022-08-09 RX ADMIN — FENTANYL CITRATE 50 MCG: 50 INJECTION INTRAMUSCULAR; INTRAVENOUS at 13:11

## 2022-08-09 RX ADMIN — MIDAZOLAM HYDROCHLORIDE 1 MG: 1 INJECTION, SOLUTION INTRAMUSCULAR; INTRAVENOUS at 13:11

## 2022-08-09 RX ADMIN — FENTANYL CITRATE 100 MCG: 50 INJECTION INTRAMUSCULAR; INTRAVENOUS at 13:04

## 2022-08-09 ASSESSMENT — ACTIVITIES OF DAILY LIVING (ADL): ADLS_ACUITY_SCORE: 35

## 2022-08-09 NOTE — LETTER
[facility] ENDOSCOPY [place]    [address] [place] [address]  Phone: [phone]  Fax: [phone]       July 28, 2022                      Arley Roldan  [address] [facility] [address] [facility] [address]          Dear Arley Roldan,        Thank you for choosing [facility] [place] [facility]. You are scheduled for the following service(s).   Please be aware that coverage of these services is subject to the terms and limitations of your health insurance plan.  Call member services at your health plan with any benefit or coverage questions.    Scheduled Endoscopy Procedure(s): Colonoscopy     Date: August 12, 2022  Scheduled MD: Dr. Molina Dunn          Arrival Time:   1:00pm  *Enter and check in at the Main Hospital Entrance  Procedure Time:  1:45pm       Location:   [facility]        Endoscopy Department, First Floor *         [address] [place], [place] [address] [phone] () or [phone] to reschedule                        STANDARD Golytely (Colyte, Nulytely)  Prep Instructions for your Colonoscopy  Please read these instructions carefully at least 7 days prior to your colonoscopy procedure. Be sure to follow all directions completely. The inside of your colon must be clean to allow for a complete examination for the presence of any growths, polyps, and/or abnormalities, as well as their biopsy or removal. A number of tips are included in order to make this part of the procedure as comfortable as possible.    Getting ready:     A nurse will call you approximately 1 week before your exam to prescribe your bowel prep and review the prep instructions with you.    You must arrange for an adult to drive you home after your exam. Your colonoscopy cannot be done unless you have a ride. If you need to use public transportation, someone must ride with you and stay with you for a minimum of 6-24 hours.    Check with your insurance  company to be sure they will cover this exam.    7 days before the exam:    Talk to your doctor:  If you take blood-thinners (such as Coumadin, Plavix, Xarelto), your prescription or schedule may need to change before the test.    Stop taking fiber supplements, multi-vitamins with iron, and medicines that contain iron.    Continue taking prescribed aspirin; talk to your prescribing doctor with any concerns.    Stop eating corn, nuts and foods with seeds.  These can stay in the colon for days.    If you have diabetes:  Ask to have your exam early in the morning.  Also, ask your doctor if you should change your diet or medicines.    3 days before the exam:    Begin a low-fiber diet: No raw fruits or vegetables, whole wheat, seeds, nuts, popcorn or other high-fiber foods (see list on page). No binding agents: (bran, Metamucil, Fibercon) and no Olestra (a fat substitute).    One day before the exam:    You can have a light, low-fiber breakfast. But drink only clear liquids after 9 a.m. (see list below). Drink at least 8 to 10 full glasses of clear liquids during the day.     Fill the jug that contains the Golytely powder with warm water. Cover and shake until well mixed. Use a full gallon of water. Chill for 3 hours, but do not add ice.    You will start drinking half of the Golytely solution at 6 p.m. You should drink the other half about 6 hours before your exam. So, the timing of Step 2 will depend on your exam time.     After you start drinking the solution, stay near a toilet. You may have watery stools (diarrhea), mild cramping, bloating , and nausea.     Step One:  o At 3 p.m., take 2 tablets of Dulcolax (bisacodyl).  o At 6 p.m. start drinking the Golytely solution. Drink an 8-ounce glass every 15 minutes until the jug is half empty. Drink each glass quickly.                   Step Two:   If you arrive before 11 AM:  At 11 p.m. on the day before your exam:    Take 2 Dulcolax (Bisacodyl) tablets.     Start  drinking the other half of the Golytely jug. Drink one 8-ounce glass every 15 minutes until the jug is empty. Drink each glass quickly.  If you arrive after 11 AM:  At 6 AM on the day of the exam:  Take 2 tablets of Dulcolax (Bisacodyl).     Drink the other half of the Golytyel jug.     Drink one 8-ounce glass every 15 minutes until the jug is empty.     Drink each glass quickly.     You should finish the prep 4 hours before the exam.      Day of exam:      You may drink clear liquids only up until 4 hours before your exam.    Do not drink anything 4 hours before your exam, not even water. (If you must take medicine, you can take it with a sip of water.)     Do not chew or swallow anything including water or gum for at least 4 hours before your exam. If you do, we may cancel the exam for your safety.     Do not take diabetes medicine by mouth until after your exam.    If you have asthma, bring your inhaler.    Arrive with an adult who will take you home after your test. The medicine used will make you sleepy. If you don't have someone to take you home, we will cancel your test.       CLEAR LIQUIDS   You may have:    Water, tea, coffee (no milk or cream)    Soda pop, Gatorade (not red or purple)    Jell-O, Popsicles (no milk or fruit pieces - not red or purple)    Fat-free soup broth or bouillon    Plain hard candy, such as clear life savers (not red or purple)    Clear juices and fruit-flavored drinks, such as apple juice, white grape juice, Hi-C, and Ross-Aid (not red or purple)   Do not have:    Milk or milk products such as ice cream, malts or shakes, or coffee creamer    Red or purple drinks of any kind such as cranberry juice or grape juice. Avoid red or purple Jell-O, Popsicles, Ross-Aid, sorbet, sherbet and candy    Juices with pulp such as orange, grapefruit, pineapple or tomato juice    Cream soups of any kind    Alcohol and beer    Protein drinks or protein powder                         LOW FIBER DIET    You may have:      Starches: White bread, rolls, biscuits, croissants, [place] toast, white flour tortillas, waffles, pancakes, Kosovan toast; white rice, noodles, pasta, macaroni; cooked and peeled potatoes; plain crackers, saltines; cooked farina or cream of rice; puffed rice, corn flakes, Rice Krispies, Special K     Vegetables: tender cooked and canned, vegetable broths    Fruits and fruit juices: Strained fruit juice, canned fruit without seeds or skin (not pineapple), applesauce, pear sauce, ripe bananas, melons (not watermelon)     Milk products: Milk (plain or flavored), cheese, cottage cheese, yogurt (no berries), custard, ice cream      Proteins: Tender, well-cooked ground beef, lamb, veal, ham, pork, chicken, turkey, fish or organ meats; eggs; creamy peanut butter     Fats and condiments:  Margarine, butter, oils, mayonnaise, sour cream, salad dressing, plain gravy; spices, cooked herbs; sugar, clear jelly, honey, syrup     Snacks, sweets and drinks: Pretzels, hard candy; plain cakes and cookies (no nuts or seeds); gelatin, plain pudding, sherbet, Popsicles; coffee, tea, carbonated ( fizzy ) drinks Do not have:      Starches: Breads or rolls that contain nuts, seeds or fruit; whole wheat or whole grain breads that contain more than 1 gram of fiber per slice; cornbread; corn or whole wheat tortillas; potatoes with skin; brown rice, wild rice, kasha (buckwheat), and oatmeal     Vegetables: Any raw or steamed vegetables; vegetables with seeds; corn in any form         Fruits and fruit juices: Prunes, prune           juice, raisins and other dried fruits, berries      and other fruits with seeds, canned             pineapple juices with pulp such as orange,     grapefruit, pineapple or tomato juice    Milk products: Any yogurt with nuts, seeds or berries     Proteins: Tough, fibrous meats with gristle; cooked dried beans, peas or lentils; crunchy peanut butter    Fats and condiments: Pickles, olives, relish,  horseradish; jam, marmalade, preserves     Snacks, sweets and drinks: Popcorn, nuts, seeds, granola, coconut, candies made with nuts or seeds; all desserts that contain nuts, seeds, raisins and other dried fruits, coconut, whole grains or bran.        FAQ:      How do you know if your colon is cleaned out?   o After completing the bowel prep, your bowel movements should be all liquid and yellow. Your bowel movements will look similar to urine in the toilet. If there are pieces of stool (poop) in the toilet, or if you can't see to the bottom of the toilet, please call our office for advice. Call [phone] and ask to speak with a nurse.     Why do you need a responsible  to take you home and stay with you?  o We require a responsible adult to take you home for your safety. The sedation medicines used to relax you during the procedure can impair your judgement and reaction time, make you forgetful and possible a little unsteady. Do not drive, make any important decisions, or sign any legal documents for 24 hours after your procedure.     It is normal to feel bloated and gassy after your procedure. Walking will help move the air through your colon. You can take non-aspirin pain relievers that contain acetaminophen (Tylenol).     When can you eat after your procedure?  o You may resume your normal diet when you feel ready, unless advised otherwise by the doctor performing your procedure. Do not drink alcohol for 24 hours after your procedure.     You many resume normal activities (work, exercise, etc.) after 24 hours.     When will you get test results?  o You should have your procedure results and any lab results (if applicable) by letter, MyChart message, or phone call within 2 weeks. If you have any questions, please call the doctor that referred you for the procedure.       Thank you for choosing  [facility] for your procedure. If you are sent a survey regarding your care, please take the time to  complete the questionnaire. We value your feedback!             Updated: 6/22/2022

## 2022-08-09 NOTE — DISCHARGE INSTRUCTIONS
The patient has received a copy of the [place]  report the doctor has written and discharge instructions have been discussed with the patient and responsible adult.  All questions were addressed and answered prior to patient discharge.     Understanding Diverticulosis and Diverticulitis     Pouches or diverticula usually occur in the lower part of the colon called the sigmoid.      Diverticulitis occurs when the pouches become inflamed.     The colon (large intestine) is the last part of the digestive tract. It absorbs water from stool and changes it from a liquid to a solid. In certain cases, small pouches called diverticula can form in the colon wall. This condition is called diverticulosis. The pouches can become infected. If this happens, it becomes a more serious problem called diverticulitis. These problems can be painful. But they can be managed.   Managing Your Condition  Diet changes or taking medications are often tried first. These may be enough to bring relief. If the case is bad, surgery may be done. You and your doctor can discuss the plan that is best for you.  If You Have Diverticulosis  Diet changes are often enough to control symptoms. The main changes are adding fiber (roughage) and drinking more water. Fiber absorbs water as it travels through your colon. This helps your stool stay soft and move smoothly. Water helps this process. If needed, you may be told to take over-the-counter stool softeners. To help relieve pain, antispasmodic medications may be prescribed.  If You Have Diverticulitis  Treatment depends on how bad your symptoms are.  For mild symptoms: You may be put on a liquid diet for a short time. You may also be prescribed antibiotics. If these two steps relieve your symptoms, you may then be prescribed a high-fiber diet. If you still have symptoms, your doctor will discuss further treatment options with you.  For severe symptoms: You may need to be admitted to the hospital. There,  you can be given IV antibiotics and fluids. Once symptoms are under control, the above treatments may be tried. If these don t control your condition, your doctor may discuss the option of having surgery with you.  [place] to Colon Health  Help keep your colon healthy with a diet that includes plenty of high-fiber fruits, vegetables, and whole grains. Drink plenty of liquids like water and juice. Your doctor may also recommend avoiding seeds and nuts.          [id number] [facility], [address] [place] [address], [place] [address]. All rights reserved. This information is not intended as a substitute for professional medical care. Always follow your healthcare professional's instructions.     Eating a High-Fiber Diet  Fiber is what gives strength and structure to plants. Most grains, beans, vegetables, and fruits contain fiber. Foods rich in fiber are often low in calories and fat, and they fill you up more. They may also reduce your risks for certain health problems. To find out the amount of fiber in canned, packaged, or frozen foods, read the  Nutrition Facts  label. It tells you how much fiber is in a serving.      Types of Fiber and Their Benefits  There are two types of fiber: insoluble and soluble. They both aid digestion and help you maintain a healthy weight.  Insoluble fiber: This is found in whole grains, cereals, certain fruits and vegetables (such as apple skin, corn, and carrots). Insoluble fiber may prevent constipation and reduce the risk of certain types of cancer.   Soluble fiber: This type of fiber is in oats, beans, and certain fruits and vegetables (such as strawberries and peas). Soluble fiber can reduce cholesterol (which may help lower the risk of heart disease), and helps control blood sugar levels.  Look for High-Fiber Foods  Whole-grain breads and cereals: Try to eat 6-8 ounces a day. Include wheat and oat bran cereals, whole-wheat muffins or toast, and corn tortillas in your meals.  Fruits: Try  to eat 2 cups a day. Apples, oranges, strawberries, pears, and bananas are good sources. (Note: Fruit juice is low in fiber.)  Vegetables: Try to eat 3 cups a day. Add asparagus, carrots, broccoli, peas, and corn to your meals.  Legumes (beans): One cup of cooked lentils gives you over 15 grams of fiber. Try navy beans, lentils, and chickpeas.  Seeds:  A small handful of seeds gives you about 3 grams of fiber. Try sunflower seeds.    Keep Track of Your Fiber  A healthy diet includes 31 grams of fiber a day if you have a 2,000-calorie diet. Keep track of how much fiber you eat. Start by reading food labels. Then eat a variety of foods high in fiber. Ask your doctor about supplemental fiber products.            [id number] [facility], [address] [place] [address], [place] [address]. All rights reserved. This information is not intended as a substitute for professional medical care. Always follow your healthcare professional's instructions.

## 2022-08-09 NOTE — H&P
Pre-Endoscopy History and Physical     Arley Roldan MRN# [id number]   YOB: 1970 Age: 51 year old     Date of Procedure: 8/9/2022  Primary care provider: Ian Hathaway  Type of Endoscopy: Colonoscopy with possible biopsy, possible polypectomy  Reason for Procedure: screen  Type of Anesthesia Anticipated: Conscious Sedation    HPI:    Arley is a 51 year old male who will be undergoing the above procedure.      A history and physical has been performed. The patient's medications and allergies have been reviewed. The risks and benefits of the procedure and the sedation options and risks were discussed with the patient.  All questions were answered and informed consent was obtained.      He denies a personal or family history of anesthesia complications or bleeding disorders.     Patient Active Problem List   Diagnosis     Migraine with aura and without status migrainosus, not intractable     Nephrolithiasis     Chronic diarrhea        Past Medical History:   Diagnosis Date     Migraines      Nephrolithiasis         Past Surgical History:   Procedure Laterality Date     CHOLECYSTECTOMY  2013    No known complications s/p surgery       ORTHOPEDIC SURGERY         Social History     Tobacco Use     Smoking status: Never Smoker     Smokeless tobacco: Never Used   Substance Use Topics     Alcohol use: Yes     Comment: Rare; social       Family History   Problem Relation Age of Onset     Alcoholism Brother      Pancreatic Cancer Maternal Grandmother 80     Colon Cancer Maternal Grandfather 75       Prior to Admission medications    Medication Sig Start Date End Date Taking? Authorizing Provider   bisacodyl (DULCOLAX) 5 MG EC tablet Take two (2) tablet at 4 pm the day before your procedure.  If your procedure is before 11 am, take two (2) additional tablets at 8 pm.  If your procedure is after 11 am, take two (2) additional tablets at 6 am. For additional instructions refer to your colonoscopy prep  "instructions. 7/28/22  Yes Molina Dunn MD   cholestyramine (QUESTRAN) 4 GM/DOSE powder TAKE 4 GRAMS BY MOUTH EVERY DAY. MIX AS DIRECTED 7/20/22  Yes Ian Hathaway MD   polyethylene glycol (GOLYTELY) 236 g suspension The night before the exam: at 6 pm start drinking an 8-ounce glass every 15 minutes until the jug is half empty (about 8 glasses). Day of exam: 6 hours before your exam check-in Drink the other half of the jug. You should finish the prep 4 hours before the exam. For additional instructions refer to your colonoscopy prep instructions. 7/28/22  Yes Molina Dunn MD   ZOLMitriptan (ZOMIG-ZMT) 5 MG ODT DISSOLVE 1 TABLET ON THE TONGUE AND SWALLOW AT THE ONSET OF MIGRAINE, MAY REPEAT ONCE AFTER 2 HOURS. MAX 2 TABLETS PER DAY 9/17/21   Arley Hameed MD       No Known Allergies     REVIEW OF SYSTEMS:   5 point ROS negative except as noted above in HPI, including Gen., Resp., CV, GI &  system review.    PHYSICAL EXAM:   Ht 1.727 m (5' 8\")   Wt 76.2 kg (168 lb)   BMI 25.54 kg/m   Estimated body mass index is 25.54 kg/m  as calculated from the following:    Height as of this encounter: 1.727 m (5' 8\").    Weight as of this encounter: 76.2 kg (168 lb).   GENERAL APPEARANCE: alert, and oriented  MENTAL STATUS: alert  AIRWAY EXAM: Mallampatti Class I (visualization of the soft palate, fauces, uvula, anterior and posterior pillars)  RESP: lungs clear to auscultation - no rales, rhonchi or wheezes  CV: regular rates and rhythm  DIAGNOSTICS:    Not indicated    IMPRESSION   ASA Class 2 - Mild systemic disease    PLAN:   Plan for Colonoscopy with possible biopsy, possible polypectomy. We discussed the risks, benefits and alternatives and the patient wished to proceed.    The above has been forwarded to the consulting provider.      Signed Electronically by: Ian Hills MD  August 9, 2022          " 142

## 2022-09-18 ENCOUNTER — HEALTH MAINTENANCE LETTER (OUTPATIENT)
Age: 52
End: 2022-09-18

## 2022-09-30 DIAGNOSIS — G43.109 MIGRAINE WITH AURA AND WITHOUT STATUS MIGRAINOSUS, NOT INTRACTABLE: ICD-10-CM

## 2022-10-01 NOTE — TELEPHONE ENCOUNTER
Routing refill request to provider for review/approval because:       Serotonin Agonists Failed 09/30/2022 02:30 PM   Protocol Details  Serotonin Agonist request needs review.    Recent (12 mo) or future (30 days) visit within the authorizing provider's specialty        Last visit 9/17/2021     Sabrina Thorne Registered Nurse  [facility]

## 2022-10-02 RX ORDER — ZOLMITRIPTAN 5 MG/1
TABLET, ORALLY DISINTEGRATING ORAL
Qty: 6 TABLET | Refills: 3 | Status: SHIPPED | OUTPATIENT
Start: 2022-10-02 | End: 2023-03-10

## 2023-01-28 ENCOUNTER — HEALTH MAINTENANCE LETTER (OUTPATIENT)
Age: 53
End: 2023-01-28

## 2023-03-10 DIAGNOSIS — G43.109 MIGRAINE WITH AURA AND WITHOUT STATUS MIGRAINOSUS, NOT INTRACTABLE: ICD-10-CM

## 2023-03-10 RX ORDER — ZOLMITRIPTAN 5 MG/1
TABLET, ORALLY DISINTEGRATING ORAL
Qty: 6 TABLET | Refills: 1 | Status: SHIPPED | OUTPATIENT
Start: 2023-03-10 | End: 2023-04-05

## 2023-03-10 NOTE — TELEPHONE ENCOUNTER
Routing refill request to provider for review/approval because:  Patient needs to be seen because it has been more than 1 year since last office visit.    Elisa Johnson RN

## 2023-03-10 NOTE — TELEPHONE ENCOUNTER
Pt uses 6 tablets in 30 days    Routing to refill pool    Norberto Roach RN on 3/10/2023 at 1:34 PM

## 2023-03-23 ENCOUNTER — TELEPHONE (OUTPATIENT)
Dept: PEDIATRICS | Facility: CLINIC | Age: 53
End: 2023-03-23

## 2023-04-05 ENCOUNTER — OFFICE VISIT (OUTPATIENT)
Dept: PEDIATRICS | Facility: CLINIC | Age: 53
End: 2023-04-05
Payer: COMMERCIAL

## 2023-04-05 VITALS
SYSTOLIC BLOOD PRESSURE: 128 MMHG | OXYGEN SATURATION: 98 % | BODY MASS INDEX: 26.22 KG/M2 | RESPIRATION RATE: 16 BRPM | HEIGHT: 68 IN | DIASTOLIC BLOOD PRESSURE: 88 MMHG | HEART RATE: 78 BPM | WEIGHT: 173 LBS | TEMPERATURE: 98 F

## 2023-04-05 DIAGNOSIS — G43.109 MIGRAINE WITH AURA AND WITHOUT STATUS MIGRAINOSUS, NOT INTRACTABLE: ICD-10-CM

## 2023-04-05 DIAGNOSIS — K52.9 CHRONIC DIARRHEA: ICD-10-CM

## 2023-04-05 DIAGNOSIS — Z90.49 HX LAPAROSCOPIC CHOLECYSTECTOMY: ICD-10-CM

## 2023-04-05 DIAGNOSIS — R40.0 HAS DAYTIME DROWSINESS: ICD-10-CM

## 2023-04-05 DIAGNOSIS — R06.83 SNORING: ICD-10-CM

## 2023-04-05 DIAGNOSIS — Z00.00 ROUTINE GENERAL MEDICAL EXAMINATION AT A HEALTH CARE FACILITY: Primary | ICD-10-CM

## 2023-04-05 DIAGNOSIS — N20.1 RIGHT URETERAL STONE: ICD-10-CM

## 2023-04-05 PROCEDURE — 99396 PREV VISIT EST AGE 40-64: CPT | Performed by: NURSE PRACTITIONER

## 2023-04-05 PROCEDURE — 99214 OFFICE O/P EST MOD 30 MIN: CPT | Mod: 25 | Performed by: NURSE PRACTITIONER

## 2023-04-05 RX ORDER — CHOLESTYRAMINE 4 G/9G
POWDER, FOR SUSPENSION ORAL
Qty: 378 G | Refills: 3 | Status: SHIPPED | OUTPATIENT
Start: 2023-04-05 | End: 2024-03-25

## 2023-04-05 RX ORDER — ZOLMITRIPTAN 5 MG/1
TABLET, ORALLY DISINTEGRATING ORAL
Qty: 30 TABLET | Refills: 1 | Status: SHIPPED | OUTPATIENT
Start: 2023-04-05 | End: 2023-10-17

## 2023-04-05 SDOH — ECONOMIC STABILITY: INCOME INSECURITY: IN THE LAST 12 MONTHS, WAS THERE A TIME WHEN YOU WERE NOT ABLE TO PAY THE MORTGAGE OR RENT ON TIME?: NO

## 2023-04-05 SDOH — HEALTH STABILITY: PHYSICAL HEALTH: ON AVERAGE, HOW MANY MINUTES DO YOU ENGAGE IN EXERCISE AT THIS LEVEL?: 0 MIN

## 2023-04-05 SDOH — ECONOMIC STABILITY: INCOME INSECURITY: HOW HARD IS IT FOR YOU TO PAY FOR THE VERY BASICS LIKE FOOD, HOUSING, MEDICAL CARE, AND HEATING?: NOT HARD AT ALL

## 2023-04-05 SDOH — ECONOMIC STABILITY: TRANSPORTATION INSECURITY
IN THE PAST 12 MONTHS, HAS THE LACK OF TRANSPORTATION KEPT YOU FROM MEDICAL APPOINTMENTS OR FROM GETTING MEDICATIONS?: NO

## 2023-04-05 SDOH — ECONOMIC STABILITY: FOOD INSECURITY: WITHIN THE PAST 12 MONTHS, THE FOOD YOU BOUGHT JUST DIDN'T LAST AND YOU DIDN'T HAVE MONEY TO GET MORE.: NEVER TRUE

## 2023-04-05 SDOH — ECONOMIC STABILITY: TRANSPORTATION INSECURITY
IN THE PAST 12 MONTHS, HAS LACK OF TRANSPORTATION KEPT YOU FROM MEETINGS, WORK, OR FROM GETTING THINGS NEEDED FOR DAILY LIVING?: NO

## 2023-04-05 SDOH — HEALTH STABILITY: PHYSICAL HEALTH: ON AVERAGE, HOW MANY DAYS PER WEEK DO YOU ENGAGE IN MODERATE TO STRENUOUS EXERCISE (LIKE A BRISK WALK)?: 0 DAYS

## 2023-04-05 SDOH — ECONOMIC STABILITY: FOOD INSECURITY: WITHIN THE PAST 12 MONTHS, YOU WORRIED THAT YOUR FOOD WOULD RUN OUT BEFORE YOU GOT MONEY TO BUY MORE.: NEVER TRUE

## 2023-04-05 ASSESSMENT — ENCOUNTER SYMPTOMS
PALPITATIONS: 0
CHILLS: 1
DIZZINESS: 0
SHORTNESS OF BREATH: 0
EYE PAIN: 0
WEAKNESS: 0
NAUSEA: 1
HEARTBURN: 1
DYSURIA: 0
PARESTHESIAS: 0
ARTHRALGIAS: 1
SORE THROAT: 0
FREQUENCY: 1
FEVER: 0
HEADACHES: 1
JOINT SWELLING: 0
HEMATURIA: 1
DIARRHEA: 0
ABDOMINAL PAIN: 1
MYALGIAS: 0
CONSTIPATION: 1
NERVOUS/ANXIOUS: 1
HEMATOCHEZIA: 0
COUGH: 0

## 2023-04-05 ASSESSMENT — LIFESTYLE VARIABLES
HOW MANY STANDARD DRINKS CONTAINING ALCOHOL DO YOU HAVE ON A TYPICAL DAY: 1 OR 2
HOW OFTEN DO YOU HAVE SIX OR MORE DRINKS ON ONE OCCASION: NEVER
AUDIT-C TOTAL SCORE: 2
HOW OFTEN DO YOU HAVE A DRINK CONTAINING ALCOHOL: 2-4 TIMES A MONTH
SKIP TO QUESTIONS 9-10: 1

## 2023-04-05 ASSESSMENT — SOCIAL DETERMINANTS OF HEALTH (SDOH)
HOW OFTEN DO YOU ATTEND CHURCH OR RELIGIOUS SERVICES?: MORE THAN 4 TIMES PER YEAR
HOW OFTEN DO YOU GET TOGETHER WITH FRIENDS OR RELATIVES?: ONCE A WEEK
IN A TYPICAL WEEK, HOW MANY TIMES DO YOU TALK ON THE PHONE WITH FAMILY, FRIENDS, OR NEIGHBORS?: ONCE A WEEK
DO YOU BELONG TO ANY CLUBS OR ORGANIZATIONS SUCH AS CHURCH GROUPS UNIONS, FRATERNAL OR ATHLETIC GROUPS, OR SCHOOL GROUPS?: NO

## 2023-04-05 ASSESSMENT — PAIN SCALES - GENERAL: PAINLEVEL: MILD PAIN (2)

## 2023-04-05 NOTE — PATIENT INSTRUCTIONS
Magnesium 400mg nightly.      Preventive Health Recommendations  Male Ages 50 - 64    Yearly exam:             See your health care provider every year in order to  o   Review health changes.   o   Discuss preventive care.    o   Review your medicines if your doctor has prescribed any.   Have a cholesterol test every 5 years, or more frequently if you are at risk for high cholesterol/heart disease.   Have a diabetes test (fasting glucose) every three years. If you are at risk for diabetes, you should have this test more often.   Have a colonoscopy at age 50, or have a yearly FIT test (stool test). These exams will check for colon cancer.    Talk with your health care provider about whether or not a prostate cancer screening test (PSA) is right for you.  You should be tested each year for STDs (sexually transmitted diseases), if you re at risk.     Shots: Get a flu shot each year. Get a tetanus shot every 10 years.     Nutrition:  Eat at least 5 servings of fruits and vegetables daily.   Eat whole-grain bread, whole-wheat pasta and brown rice instead of white grains and rice.   Get adequate Calcium and Vitamin D.     Lifestyle  Exercise for at least 150 minutes a week (30 minutes a day, 5 days a week). This will help you control your weight and prevent disease.   Limit alcohol to one drink per day.   No smoking.   Wear sunscreen to prevent skin cancer.   See your dentist every six months for an exam and cleaning.   See your eye doctor every 1 to 2 years.    Preventive Health Recommendations  Male Ages 50 - 64    Yearly exam:             See your health care provider every year in order to  o   Review health changes.   o   Discuss preventive care.    o   Review your medicines if your doctor has prescribed any.   Have a cholesterol test every 5 years, or more frequently if you are at risk for high cholesterol/heart disease.   Have a diabetes test (fasting glucose) every three years. If you are at risk for diabetes, you  should have this test more often.   Have a colonoscopy at age 50, or have a yearly FIT test (stool test). These exams will check for colon cancer.    Talk with your health care provider about whether or not a prostate cancer screening test (PSA) is right for you.  You should be tested each year for STDs (sexually transmitted diseases), if you re at risk.     Shots: Get a flu shot each year. Get a tetanus shot every 10 years.     Nutrition:  Eat at least 5 servings of fruits and vegetables daily.   Eat whole-grain bread, whole-wheat pasta and brown rice instead of white grains and rice.   Get adequate Calcium and Vitamin D.     Lifestyle  Exercise for at least 150 minutes a week (30 minutes a day, 5 days a week). This will help you control your weight and prevent disease.   Limit alcohol to one drink per day.   No smoking.   Wear sunscreen to prevent skin cancer.   See your dentist every six months for an exam and cleaning.   See your eye doctor every 1 to 2 years.

## 2023-04-05 NOTE — PROGRESS NOTES
SUBJECTIVE:   CC: Arley is an 52 year old who presents for preventative health visit.       4/5/2023     7:19 AM   Additional Questions   Roomed by brodie   Healthy Habits:     Getting at least 3 servings of Calcium per day:  Yes    Bi-annual eye exam:  Yes    Dental care twice a year:  NO    Sleep apnea or symptoms of sleep apnea:  Daytime drowsiness and Excessive snoring    Diet:  Regular (no restrictions)    Frequency of exercise:  None    Taking medications regularly:  Yes    Medication side effects:  None    PHQ-2 Total Score: 2    Additional concerns today:  No      Today's PHQ-2 Score:       4/5/2023     7:10 AM   PHQ-2 ( 1999 [employer])   Q1: Little interest or pleasure in doing things 1   Q2: Feeling down, depressed or hopeless 1   PHQ-2 Score 2   Q1: Little interest or pleasure in doing things Several days    Several days   Q2: Feeling down, depressed or hopeless Several days    Several days   PHQ-2 Score 2    2   Recent stressors:  Father passed away last week.  Has been managing his estate.  Kidney stones. ER 3/23     States this is not normal for him to feel this way.    Social History     Tobacco Use     Smoking status: Never     Smokeless tobacco: Never   Vaping Use     Vaping status: Not on file   Substance Use Topics     Alcohol use: Yes     Comment: Rare; social           4/5/2023     7:10 AM   Alcohol Use   Prescreen: >3 drinks/day or >7 drinks/week? No       Last PSA: No results found for: PSA.  No family history.  Colonoscopy 2022.  Repeat 10 years.    Sleep apnea screening: daytime drowsiness and snoring.      6mm right proximal ureteral stone  -Urologist last week.  Gave option of letting it pass of ureteroscopy  -Has to pass within 6 weeks or he has to take it out.  -Repeat CT needed in 3 weeks    Migraines:  -x10 monthly.  Has tried preventatives but did not work.  Did do Depakote but stopped due to mood altering.  -Did see neurology in [place].  Last option was Botox but does not want.    Chronic  "diarrhea:  -2012 due to gallbladder removal      Reviewed orders with patient. Reviewed health maintenance and updated orders accordingly - Yes    Reviewed and updated as needed this visit by clinical staff   Tobacco  Allergies  Meds              Reviewed and updated as needed this visit by Provider    Allergies  Meds               Review of Systems   Constitutional: Negative for fever.   HENT: Negative for congestion, ear pain, hearing loss and sore throat.    Eyes: Negative for pain and visual disturbance.   Respiratory: Negative for cough and shortness of breath.    Cardiovascular: Negative for palpitations and peripheral edema.   Gastrointestinal: Positive for abdominal pain, heartburn and nausea. Negative for diarrhea and hematochezia.   Genitourinary: Positive for frequency, hematuria and urgency. Negative for dysuria and genital sores.   Musculoskeletal: Negative for joint swelling and myalgias.   Skin: Negative for rash.   Neurological: Positive for headaches. Negative for dizziness, weakness and paresthesias.   Psychiatric/Behavioral: Positive for mood changes. The patient is nervous/anxious.        OBJECTIVE:   /88 (Cuff Size: Adult Regular)   Pulse 78   Temp 98  F (36.7  C) (Tympanic)   Resp 16   Ht 1.727 m (5' 8\")   Wt 78.5 kg (173 lb)   SpO2 98%   BMI 26.30 kg/m      Physical Exam  GENERAL: healthy, alert and no distress  EYES: Eyes grossly normal to inspection, PERRL and conjunctivae and sclerae normal  HENT: ear canals and TM's normal, nose and mouth without ulcers or lesions  NECK: no adenopathy, no asymmetry, masses, or scars and thyroid normal to palpation  RESP: lungs clear to auscultation - no rales, rhonchi or wheezes  CV: regular rate and rhythm, normal S1 S2, no S3 or S4, no murmur, click or rub, no peripheral edema and peripheral pulses strong  ABDOMEN: soft, nontender, no hepatosplenomegaly, no masses and bowel sounds normal  MS: no gross musculoskeletal defects noted, no " "edema  NEURO: Normal strength and tone, mentation intact and speech normal  PSYCH: mentation appears normal, affect normal/bright      ASSESSMENT/PLAN:     Routine general medical examination at a health care facility  Routine exam performed today. Age appropriate screening and preventative care have been addressed today. Vaccinations have been declined. Recommend annual vision exams as well as biannual dental exams. They will follow up for annual physical again in one year.     Migraine with aura and without status migrainosus, not intractable  Chronic. Has seen neurology in the past and tried several preventative medications.  Currently taking zolmitriptan which does help.  Interested in a preventative option.  Declines referral to neurology due to not wanting Botox as he states this was the last preventative option.  Prescribed Nurtec today.  Will submit PA if needed.  - ZOLMitriptan (ZOMIG-ZMT) 5 MG ODT  Dispense: 30 tablet; Refill: 1  - rimegepant (NURTEC) 75 MG ODT tablet  Dispense: 90 tablet; Refill: 0    Chronic diarrhea  Hx laparoscopic cholecystectomy  Stable.  Related to gallbladder removal several year ago.  - cholestyramine (QUESTRAN) 4 GM/DOSE powder  Dispense: 378 g; Refill: 3    Right ureteral stone  Followed by urology.      Snoring  Has daytime drowsiness  Does have symptoms of sleep apnea.  Declines referral today.        COUNSELING:   Reviewed preventive health counseling, as reflected in patient instructions       Regular exercise       Healthy diet/nutrition       Vision screening       Colorectal cancer screening       Prostate cancer screening       Osteoporosis prevention/bone health      BMI:   Estimated body mass index is 26.3 kg/m  as calculated from the following:    Height as of this encounter: 1.727 m (5' 8\").    Weight as of this encounter: 78.5 kg (173 lb).   Weight management plan: Discussed healthy diet and exercise guidelines      He reports that he has never smoked. He has never " used smokeless tobacco.            Oralia Petit NP  [facility]

## 2023-04-25 ENCOUNTER — TELEPHONE (OUTPATIENT)
Dept: PEDIATRICS | Facility: CLINIC | Age: 53
End: 2023-04-25
Payer: COMMERCIAL

## 2023-04-25 NOTE — TELEPHONE ENCOUNTER
Patient calling and states he has a kidney stone he is having removed 23.  Patient states he has a 10 hour drive for a  and is wanting pain medications.  Being seen at [facility] for the kidney stone.  Advised starting with that provider to see if they are able to give pain med as they are seeing and treating.  Advised if not can try to send E-Visit but may be advised to be seen.  Also discussed UC if needed if no clinic appts available before leaving.  Patient agrees with plan.  Elisa Johnson RN

## 2023-05-17 ENCOUNTER — OFFICE VISIT (OUTPATIENT)
Dept: PEDIATRICS | Facility: CLINIC | Age: 53
End: 2023-05-17
Payer: COMMERCIAL

## 2023-05-17 VITALS
OXYGEN SATURATION: 94 % | DIASTOLIC BLOOD PRESSURE: 92 MMHG | HEIGHT: 68 IN | BODY MASS INDEX: 26.3 KG/M2 | SYSTOLIC BLOOD PRESSURE: 145 MMHG | TEMPERATURE: 98.2 F | RESPIRATION RATE: 16 BRPM | WEIGHT: 173.5 LBS | HEART RATE: 71 BPM

## 2023-05-17 DIAGNOSIS — N20.1 RIGHT URETERAL STONE: ICD-10-CM

## 2023-05-17 DIAGNOSIS — Z01.818 PRE-OP EXAM: Primary | ICD-10-CM

## 2023-05-17 PROCEDURE — 99214 OFFICE O/P EST MOD 30 MIN: CPT | Mod: GC | Performed by: STUDENT IN AN ORGANIZED HEALTH CARE EDUCATION/TRAINING PROGRAM

## 2023-05-17 ASSESSMENT — PAIN SCALES - GENERAL: PAINLEVEL: NO PAIN (0)

## 2023-05-17 NOTE — PROGRESS NOTES
[facility]  [address] [place] [address]  [address]  [facility] [address]  Phone: [phone]  Fax: [phone]  Primary Provider: Ian Hathaway  Pre-op Performing Provider: TRI CHILDRESS      PREOPERATIVE EVALUATION:  Today's date: 5/17/2023    Arley Roldan is a 52 year old male who presents for a preoperative evaluation.      5/17/2023     3:09 PM   Additional Questions   Roomed by Bettye Cherry     Surgical Information:  Surgery/Procedure: Holmium Laser Cystoscopic   Surgery Location: [facility]-  Surgeon: Jani   Surgery Date: 6/6  Time of Surgery: Undetermined   Where patient plans to recover: At home with family  Fax number for surgical facility: [phone]    Assessment & Plan     The proposed surgical procedure is considered LOW risk.    Pre-op exam  Patient is overall healthy with no concerning features for increased preoperative risk. Good candidate for surgery, approval given.    Right ureteral stone  Patient with right 6 mm ureteral stone. Failed conservative management requiring ureteroscopic retrieval with urology as above.    Elevated BP w/o HTN  BP typically wnl in previous checks. HTN likely secondary to pain as patient is currently having migraine and pain from stone. No intervention needed at this time.     Possible Sleep Apnea: Patient with heavy snoring per his wife, never evaluated by sleep study, no evidence of apnea reported        - No identified additional risk factors other than previously addressed    Antiplatelet or Anticoagulation Medication Instructions:   - Patient is on no antiplatelet or anticoagulation medications.    Additional Medication Instructions:  Recommended the patient hold his daily medications prior to surgery    RECOMMENDATION:  APPROVAL GIVEN to proceed with proposed procedure, without further diagnostic evaluation.            Subjective       HPI related to upcoming procedure: Pt has history of 6 mm renal stone requiring  uereteroscopy to remove. Ongoing pain symptoms, no hematuria, no current infectious symptoms. Failed conservative expectant management.         5/17/2023    12:39 PM   Preop Questions   1. Have you ever had a heart attack or stroke? No   2. Have you ever had surgery on your heart or blood vessels, such as a stent placement, a coronary artery bypass, or surgery on an artery in your head, neck, heart, or legs? No   3. Do you have chest pain with activity? No   4. Do you have a history of  heart failure? No   5. Do you currently have a cold, bronchitis or symptoms of other infection? No   6. Do you have a cough, shortness of breath, or wheezing? No   7. Do you or anyone in your family have previous history of blood clots? No   8. Do you or does anyone in your family have a serious bleeding problem such as prolonged bleeding following surgeries or cuts? No   9. Have you ever had problems with anemia or been told to take iron pills? No   10. Have you had any abnormal blood loss such as black, tarry or bloody stools? No   11. Have you ever had a blood transfusion? No   12. Are you willing to have a blood transfusion if it is medically needed before, during, or after your surgery? Yes   13. Have you or any of your relatives ever had problems with anesthesia? No   14. Do you have sleep apnea, excessive snoring or daytime drowsiness? YES - Has not been evaluated by sleep study yet.    14a. Do you have a CPAP machine? No   15. Do you have any artifical heart valves or other implanted medical devices like a pacemaker, defibrillator, or continuous glucose monitor? No   16. Do you have artificial joints? No   17. Are you allergic to latex? No       Health Care Directive:  Patient does not have a Health Care Directive or Living Will:     Preoperative Review of :   reviewed - controlled substances prescribed by other outside provider(s).      Review of Systems  Constitutional, neuro, ENT, endocrine, pulmonary, cardiac,  "gastrointestinal, genitourinary, musculoskeletal, integument and psychiatric systems are negative, except as otherwise noted.    Patient Active Problem List    Diagnosis Date Noted     Chronic diarrhea 02/27/2019     Priority: Medium     Migraine with aura and without status migrainosus, not intractable 08/12/2018     Priority: Medium     Previously seen in [place] (abstracted in media) - has tried gabapentin, amitriptyline, topamax and propranolol w/o help. Tried depakote, but had side effects -- trying at lower dose as of 2017.  Next step per note would be botox.       Nephrolithiasis 08/12/2018     Priority: Medium      Past Medical History:   Diagnosis Date     Migraines      Nephrolithiasis      Past Surgical History:   Procedure Laterality Date     CHOLECYSTECTOMY  2013    No known complications s/p surgery       COLONOSCOPY N/A 8/9/2022    Procedure: COLONOSCOPY (fv);  Surgeon: Ian Hills MD;  Location:  GI     ORTHOPEDIC SURGERY       Current Outpatient Medications   Medication Sig Dispense Refill     cholestyramine (QUESTRAN) 4 GM/DOSE powder TAKE 4 GRAMS BY MOUTH EVERY DAY. MIX AS DIRECTED. 378 g 3     ZOLMitriptan (ZOMIG-ZMT) 5 MG ODT DISSOLVE 1 TABLET ON THE TONGUE AND SWALLOW AT ONSET OF MIGRAINE. MAY REPEAT ONCE AFTER 2 HOURS. MAXIMUM 2 TABLETS DAILY. 30 tablet 1     rimegepant (NURTEC) 75 MG ODT tablet Place 1 tablet (75 mg) under the tongue every 48 hours 90 tablet 0     No Known Allergies     Social History     Tobacco Use     Smoking status: Never     Smokeless tobacco: Never   Vaping Use     Vaping status: Not on file   Substance Use Topics     Alcohol use: Yes     Comment: Rare; social   No recreational drug use     History   Drug Use No         Objective     BP (!) 145/92   Pulse 71   Temp 98.2  F (36.8  C) (Tympanic)   Resp 16   Ht 1.727 m (5' 8\")   Wt 78.7 kg (173 lb 8 oz)   SpO2 94%   BMI 26.38 kg/m      Physical Exam    GENERAL APPEARANCE: healthy, alert and no distress   "   EYES: EOMI,  PERRL     HENT: ear canals and TM's normal and nose and mouth without ulcers or lesions     NECK: no adenopathy, no asymmetry, masses, or scars and thyroid normal to palpation     RESP: lungs clear to auscultation - no rales, rhonchi or wheezes     CV: regular rates and rhythm, normal S1 S2, no S3 or S4 and no murmur, click or rub     ABDOMEN:  soft, right flank tenderness, otherwise nontender, no HSM or masses and bowel sounds normal     MS: extremities normal- no gross deformities noted, no evidence of inflammation in joints, FROM in all extremities.     SKIN: no suspicious lesions or rashes     NEURO: Normal strength and tone, sensory exam grossly normal, mentation intact and speech normal     PSYCH: mentation appears normal. and affect normal/bright     LYMPHATICS: No cervical adenopathy    Diagnostics:  No labs were ordered during this visit.   No EKG required, no history of coronary heart disease, significant arrhythmia, peripheral arterial disease or other structural heart disease.    Revised Cardiac Risk Index (RCRI):  The patient has the following serious cardiovascular risks for perioperative complications:   - No serious cardiac risks = 0 points     RCRI Interpretation: 0 points: Class I (very low risk - 0.4% complication rate)           Signed Electronically by: Arturo Mullen MD  Copy of this evaluation report is provided to requesting physician.

## 2023-05-17 NOTE — PATIENT INSTRUCTIONS
Great seeing you in clinic. Good luck on your procedure. We recommend staying away from NSAIDs (ibuprofen, naproxen) and Aspirin before your procedure.

## 2023-08-18 ENCOUNTER — OFFICE VISIT (OUTPATIENT)
Dept: URGENT CARE | Facility: URGENT CARE | Age: 53
End: 2023-08-18
Payer: COMMERCIAL

## 2023-08-18 VITALS
SYSTOLIC BLOOD PRESSURE: 112 MMHG | WEIGHT: 175 LBS | HEART RATE: 82 BPM | TEMPERATURE: 97.6 F | OXYGEN SATURATION: 97 % | HEIGHT: 68 IN | BODY MASS INDEX: 26.52 KG/M2 | DIASTOLIC BLOOD PRESSURE: 82 MMHG

## 2023-08-18 DIAGNOSIS — B96.89 ACUTE BACTERIAL BRONCHITIS: Primary | ICD-10-CM

## 2023-08-18 DIAGNOSIS — J45.31 EXACERBATION OF REACTIVE AIRWAY DISEASE, MILD PERSISTENT: ICD-10-CM

## 2023-08-18 DIAGNOSIS — J20.8 ACUTE BACTERIAL BRONCHITIS: Primary | ICD-10-CM

## 2023-08-18 PROCEDURE — 99214 OFFICE O/P EST MOD 30 MIN: CPT | Performed by: PHYSICIAN ASSISTANT

## 2023-08-18 RX ORDER — BENZONATATE 200 MG/1
200 CAPSULE ORAL 3 TIMES DAILY PRN
Qty: 30 CAPSULE | Refills: 0 | Status: SHIPPED | OUTPATIENT
Start: 2023-08-18 | End: 2024-07-02

## 2023-08-18 RX ORDER — PREDNISONE 20 MG/1
TABLET ORAL
Qty: 14 TABLET | Refills: 0 | Status: SHIPPED | OUTPATIENT
Start: 2023-08-18 | End: 2024-07-02

## 2023-08-18 RX ORDER — AZITHROMYCIN 250 MG/1
TABLET, FILM COATED ORAL
Qty: 6 TABLET | Refills: 0 | Status: SHIPPED | OUTPATIENT
Start: 2023-08-18 | End: 2023-08-23

## 2023-08-18 RX ORDER — PREDNISONE 20 MG/1
TABLET ORAL
Qty: 14 TABLET | Refills: 0 | Status: SHIPPED | OUTPATIENT
Start: 2023-08-18 | End: 2023-08-18

## 2023-08-18 NOTE — PROGRESS NOTES
Assessment & Plan     Acute bacterial bronchitis    Bronchitis is inflammation of the bronchial tubes, which carry air to the lungs. The tubes swell and produce mucus, or phlegm. The mucus and inflamed bronchial tubes make you cough. You may have trouble breathing.  Most cases of bronchitis are caused by viruses but in your case we are more concerned about a bacterial infection. . Antibiotics usually are helpful in this situation to help you clear this bacterial infection.  Bronchitis usually develops rapidly and lasts about 2 to 3 weeks in otherwise healthy people.    Zpak for bacterial bronchitis  Tessalon for coughing    - azithromycin (ZITHROMAX) 250 MG tablet; Take 2 tablets (500 mg) by mouth daily for 1 day, THEN 1 tablet (250 mg) daily for 4 days.  - benzonatate (TESSALON) 200 MG capsule; Take 1 capsule (200 mg) by mouth 3 times daily as needed for cough    Exacerbation of reactive airway disease, mild persistent    Due to hx of ongoing asthma like symptoms he has needed prednisone  We will use a tapering dose of prednisone for reactive airway    - predniSONE (DELTASONE) 20 MG tablet; 1 tab po every day for 5 days, then 1 tab po every day for 3 days, then 1 tab po every day for 2 days    Review of external notes as documented elsewhere in note     At today's visit with Arley Roldan , we discussed results, diagnosis, medications and formulated a plan.  We also discussed red flags for immediate return to clinic/ER, as well as indications for follow up with PCP if not improved in 3 days. Patient understood and agreed to plan. Arley Roldan was discharged with stable vitals and has no further questions.       No follow-ups on file.    Toni Hearn, [facility], PA-C  [facility] [place]   Arley is a 52 year old, presenting for the following health issues:  Urgent Care and Cough (Dealing with a cough x 1 week. Has had this before where it lasts a really  long time. )      HPI  "  Review of Systems   Constitutional, HEENT, cardiovascular, pulmonary, GI, , musculoskeletal, neuro, skin, endocrine and psych systems are negative, except as otherwise noted.      Objective    /82   Pulse 82   Temp 97.6  F (36.4  C) (Temporal)   Ht 1.727 m (5' 8\")   Wt 79.4 kg (175 lb)   SpO2 97%   BMI 26.61 kg/m    Body mass index is 26.61 kg/m .  Physical Exam   GENERAL: healthy, alert and no distress  EYES: Eyes grossly normal to inspection, PERRL and conjunctivae and sclerae normal  HENT: ear canals and TM's normal, nose and mouth without ulcers or lesions  NECK: no adenopathy, no asymmetry, masses, or scars and thyroid normal to palpation  RESP: Positive for bronchospasms and coughing  CV: regular rate and rhythm, normal S1 S2, no S3 or S4, no murmur, click or rub, no peripheral edema and peripheral pulses strong  MS: no gross musculoskeletal defects noted, no edema  SKIN: no suspicious lesions or rashes  NEURO: Normal strength and tone, mentation intact and speech normal  PSYCH: mentation appears normal, affect normal/bright                      "

## 2023-08-22 ENCOUNTER — ANCILLARY PROCEDURE (OUTPATIENT)
Dept: GENERAL RADIOLOGY | Facility: CLINIC | Age: 53
End: 2023-08-22
Attending: PHYSICIAN ASSISTANT
Payer: COMMERCIAL

## 2023-08-22 ENCOUNTER — OFFICE VISIT (OUTPATIENT)
Dept: URGENT CARE | Facility: URGENT CARE | Age: 53
End: 2023-08-22
Payer: COMMERCIAL

## 2023-08-22 VITALS
RESPIRATION RATE: 20 BRPM | HEART RATE: 78 BPM | SYSTOLIC BLOOD PRESSURE: 155 MMHG | TEMPERATURE: 98 F | OXYGEN SATURATION: 96 % | DIASTOLIC BLOOD PRESSURE: 94 MMHG

## 2023-08-22 DIAGNOSIS — R05.2 SUBACUTE COUGH: ICD-10-CM

## 2023-08-22 DIAGNOSIS — J20.9 ACUTE BRONCHITIS, UNSPECIFIED ORGANISM: ICD-10-CM

## 2023-08-22 DIAGNOSIS — J20.9 ACUTE BRONCHITIS, UNSPECIFIED ORGANISM: Primary | ICD-10-CM

## 2023-08-22 PROCEDURE — 71046 X-RAY EXAM CHEST 2 VIEWS: CPT | Mod: TC | Performed by: RADIOLOGY

## 2023-08-22 PROCEDURE — 99214 OFFICE O/P EST MOD 30 MIN: CPT | Performed by: PHYSICIAN ASSISTANT

## 2023-08-22 RX ORDER — CODEINE PHOSPHATE AND GUAIFENESIN 10; 100 MG/5ML; MG/5ML
1-2 SOLUTION ORAL
Qty: 236 ML | Refills: 0 | Status: SHIPPED | OUTPATIENT
Start: 2023-08-22 | End: 2024-07-02

## 2023-08-22 RX ORDER — DEXTROMETHORPHAN POLISTIREX 30 MG/5ML
60 SUSPENSION ORAL EVERY MORNING
Qty: 148 ML | Refills: 0 | Status: SHIPPED | OUTPATIENT
Start: 2023-08-22 | End: 2024-07-02

## 2023-08-22 RX ORDER — ALBUTEROL SULFATE 90 UG/1
2 AEROSOL, METERED RESPIRATORY (INHALATION) EVERY 6 HOURS PRN
Qty: 18 G | Refills: 0 | Status: SHIPPED | OUTPATIENT
Start: 2023-08-22 | End: 2024-07-02

## 2023-08-22 NOTE — PROGRESS NOTES
Assessment & Plan     Acute bronchitis, unspecified organism    Chest xray Negative for acute findings, read by Toni SLOAN at time of visit.    Appears to be lung tightness with bronchospasms  Albuterol inhaler    - XR Chest 2 Views; Future  - albuterol (PROAIR HFA/PROVENTIL HFA/VENTOLIN HFA) 108 (90 Base) MCG/ACT inhaler; Inhale 2 puffs into the lungs every 6 hours as needed for shortness of breath, wheezing or cough    Subacute cough    Robitussin ac at night time for coughing  Delsym for day time cough  May still use tessalon  Continue prednisone  Use albuterol    - guaiFENesin-codeine (ROBITUSSIN AC) 100-10 MG/5ML solution; Take 5-10 mLs by mouth nightly as needed for cough  - dextromethorphan (DELSYM) 30 MG/5ML liquid; Take 10 mLs (60 mg) by mouth every morning    Review of external notes as documented elsewhere in note       At today's visit with Arley Roldan , we discussed results, diagnosis, medications and formulated a plan.  We also discussed red flags for immediate return to clinic/ER, as well as indications for follow up with PCP if not improved in 3 days. Patient understood and agreed to plan. Arley Roldan was discharged with stable vitals and has no further questions.       No follow-ups on file.    Toni Hearn, [facility], [place]A  [facility]    Mishel Tubbs is a 52 year old, presenting for the following health issues:  Cough (Cough pt was seen on Friday meds are not working cough is getting worse )      HPI   Review of Systems   Constitutional, HEENT, cardiovascular, pulmonary, GI, , musculoskeletal, neuro, skin, endocrine and psych systems are negative, except as otherwise noted.      Objective    BP (!) 155/94   Pulse 78   Temp 98  F (36.7  C)   Resp 20   SpO2 96%   There is no height or weight on file to calculate BMI.  Physical Exam   GENERAL: healthy, alert and no distress  EYES: Eyes grossly normal to inspection, PERRL and conjunctivae and  sclerae normal  HENT: ear canals and TM's normal, nose and mouth without ulcers or lesions  NECK: no adenopathy, no asymmetry, masses, or scars and thyroid normal to palpation  RESP: expiratory wheezes mild and diffuse  CV: regular rate and rhythm, normal S1 S2, no S3 or S4, no murmur, click or rub, no peripheral edema and peripheral pulses strong  ABDOMEN: soft, nontender, no hepatosplenomegaly, no masses and bowel sounds normal  MS: no gross musculoskeletal defects noted, no edema  SKIN: no suspicious lesions or rashes  NEURO: Normal strength and tone, mentation intact and speech normal  PSYCH: mentation appears normal, affect normal/bright    Chest xray Negative for acute findings, read by Toni SLOAN at time of visit.

## 2023-10-17 DIAGNOSIS — G43.109 MIGRAINE WITH AURA AND WITHOUT STATUS MIGRAINOSUS, NOT INTRACTABLE: ICD-10-CM

## 2023-10-17 RX ORDER — ZOLMITRIPTAN 5 MG/1
TABLET, ORALLY DISINTEGRATING ORAL
Qty: 30 TABLET | Refills: 3 | Status: SHIPPED | OUTPATIENT
Start: 2023-10-17 | End: 2024-06-17

## 2024-03-06 ENCOUNTER — PATIENT OUTREACH (OUTPATIENT)
Dept: CARE COORDINATION | Facility: CLINIC | Age: 54
End: 2024-03-06
Payer: COMMERCIAL

## 2024-03-07 ENCOUNTER — NURSE TRIAGE (OUTPATIENT)
Dept: PEDIATRICS | Facility: CLINIC | Age: 54
End: 2024-03-07

## 2024-03-07 NOTE — TELEPHONE ENCOUNTER
Patient and wife calling. CTC on file.   Nurse Triage SBAR    Is this a 2nd Level Triage? YES, LICENSED PRACTITIONER REVIEW IS REQUIRED    Situation: COVID positive, cough.    Background: Symptoms started Sunday, home test Sunday positive    Assessment: Patient has sore throat, lost voice, dry cough, fatigue, body aches, chills, headache.   Patient states he does have some chest pain/pressure when not coughing.      Denies wheezing, difficulty breathing, shortness of breath, fever.       Protocol Recommended Disposition:   Go To ED/C Now (Or To Office With PCP Approval)    Recommendation: Patient refused ED; [facility] evaluation tomorrow? Please advise.     Instructed pt to call back if new or worsening symptoms.  Patient was given an opportunity to ask questions, verbalized understanding of plan, and is agreeable.       Routed to provider    Does the patient meet one of the following criteria for ADS visit consideration? 16+ years old, with an [facility] PCP     TIP  Providers, please consider if this condition is appropriate for management at one of our Acute and Diagnostic Services sites.     If patient is a good candidate, please use dotphrase <dot>triageresponse and select Refer to ADS to document.  Verbal CTC    Yvette KIM RN on 3/7/2024 at 4:59 PM       Reason for Disposition   Chest pain or pressure  (Exception: MILD central chest pain, present only when coughing.)    Additional Information   Negative: SEVERE difficulty breathing (e.g., struggling for each breath, speaks in single words)   Negative: Difficult to awaken or acting confused (e.g., disoriented, slurred speech)   Negative: Bluish (or gray) lips or face now   Negative: Shock suspected (e.g., cold/pale/clammy skin, too weak to stand, low BP, rapid pulse)   Negative: Sounds like a life-threatening emergency to the triager   Negative: Diagnosed or suspected COVID-19 and symptoms lasting 3 or more weeks   Negative:  "COVID-19 exposure and no symptoms   Negative: COVID-19 vaccine reaction suspected (e.g., fever, headache, muscle aches) occurring 1 to 3 days after getting vaccine   Negative: COVID-19 vaccine, questions about   Negative: Lives with someone known to have influenza (flu test positive) and flu-like symptoms (e.g., cough, runny nose, sore throat, SOB; with or without fever)   Negative: Possible COVID-19 symptoms and triager concerned about severity of symptoms or other causes   Negative: COVID-19 and breastfeeding, questions about   Negative: SEVERE or constant chest pain or pressure  (Exception: Mild central chest pain, present only when coughing.)   Negative: MODERATE difficulty breathing (e.g., speaks in phrases, SOB even at rest, pulse 100-120)   Negative: Headache and stiff neck (can't touch chin to chest)   Negative: Oxygen level (e.g., pulse oximetry) 90% or lower    Answer Assessment - Initial Assessment Questions  1. COVID-19 DIAGNOSIS: \"How do you know that you have COVID?\" (e.g., positive lab test or self-test, diagnosed by doctor or NP/PA, symptoms after exposure).      Home test Sunday  2. COVID-19 EXPOSURE: \"Was there any known exposure to COVID before the symptoms began?\" CDC Definition of close contact: within 6 feet (2 meters) for a total of 15 minutes or more over a 24-hour period.       unknown  3. ONSET: \"When did the COVID-19 symptoms start?\"       Sunday  4. WORST SYMPTOM: \"What is your worst symptom?\" (e.g., cough, fever, shortness of breath, muscle aches)      Sore throat, fatigue  5. COUGH: \"Do you have a cough?\" If Yes, ask: \"How bad is the cough?\"        Cough- yes dry cough  6. FEVER: \"Do you have a fever?\" If Yes, ask: \"What is your temperature, how was it measured, and when did it start?\"      Afebrile currently  7. RESPIRATORY STATUS: \"Describe your breathing?\" (e.g., normal; shortness of breath, wheezing, unable to speak)       Wheezing/shortness of breath no  8. BETTER-SAME-WORSE: \"Are " "you getting better, staying the same or getting worse compared to yesterday?\"  If getting worse, ask, \"In what way?\"      same  9. OTHER SYMPTOMS: \"Do you have any other symptoms?\"  (e.g., chills, fatigue, headache, loss of smell or taste, muscle pain, sore throat)      Body aches, chills, headache,   10. HIGH RISK DISEASE: \"Do you have any chronic medical problems?\" (e.g., asthma, heart or lung disease, weak immune system, obesity, etc.)        Mild asthma when younger  11. VACCINE: \"Have you had the COVID-19 vaccine?\" If Yes, ask: \"Which one, how many shots, when did you get it?\"        no  12. PREGNANCY: \"Is there any chance you are pregnant?\" \"When was your last menstrual period?\"        no  13. O2 SATURATION MONITOR:  \"Do you use an oxygen saturation monitor (pulse oximeter) at home?\" If Yes, ask \"What is your reading (oxygen level) today?\" \"What is your usual oxygen saturation reading?\" (e.g., 95%)        no    Protocols used: Coronavirus (COVID-19) Diagnosed or [id number]    "

## 2024-03-07 NOTE — TELEPHONE ENCOUNTER
RN attempted to reach patient. LVMTCB and speak with a triage nurse.    Yvette KIM RN on 3/7/2024 at 5:50 PM

## 2024-03-07 NOTE — TELEPHONE ENCOUNTER
RN attempted to reach patient. LVMTCB and speak with a triage nurse.    RN attempted to reach patient's wife LVMTCB and speak with a triage nurse.       Yvette KIM RN on 3/7/2024 at 5:33 PM     Patient calls back; offer appt with ruby domínguez  3/8/24  Provider Recommendation Follow Up:   Unable to reach patient/caregiver. Left message to return call to [phone] (To enter phone number for call back directly to clinic/staff member). Upon return call please notify caller of provider's recommendations.

## 2024-03-11 NOTE — TELEPHONE ENCOUNTER
MC message sent for check in- inquiring about symptoms    Yvette KIM RN on 3/11/2024 at 8:57 AM

## 2024-03-25 DIAGNOSIS — K52.9 CHRONIC DIARRHEA: ICD-10-CM

## 2024-03-25 RX ORDER — CHOLESTYRAMINE 4 G/9G
POWDER, FOR SUSPENSION ORAL
Qty: 378 G | Refills: 0 | Status: SHIPPED | OUTPATIENT
Start: 2024-03-25 | End: 2024-06-11

## 2024-03-25 NOTE — LETTER
March 29, 2024      Arley Roldan  [address] [address]        Dear Arley,     We have been attempting to reach you to schedule a visit. We recently received a refill request for cholestyramine (QUESTRAN) 4 GM/DOSE powder which has been filled for 90 days. Our records show you are due for a visit before any future refills can occur. Please call the clinic at [phone] or you may schedule through [facility].    Thank you,    [facility]

## 2024-05-05 ENCOUNTER — HEALTH MAINTENANCE LETTER (OUTPATIENT)
Age: 54
End: 2024-05-05

## 2024-05-14 ENCOUNTER — TELEPHONE (OUTPATIENT)
Dept: PEDIATRICS | Facility: CLINIC | Age: 54
End: 2024-05-14
Payer: COMMERCIAL

## 2024-05-14 DIAGNOSIS — G43.109 MIGRAINE WITH AURA AND WITHOUT STATUS MIGRAINOSUS, NOT INTRACTABLE: Primary | ICD-10-CM

## 2024-05-14 NOTE — TELEPHONE ENCOUNTER
Prior Authorization Retail Medication Request    Medication/Dose: ZOLMitriptan (ZOMIG-ZMT) 5 MG ODT   Diagnosis and ICD code (if different than what is on RX):  G43.109  New/renewal/insurance change PA/secondary ins. PA:  Previously Tried and Failed:    Rationale:      Insurance   Primary: [employer]   Insurance ID:  [id number]    Secondary (if applicable):  Insurance ID:      Pharmacy Information (if different than what is on RX)  Name:  [facility] [place]   Phone:  [phone]  Fax: [phone]    Thuan LERMA RN 5/14/2024 at 7:29 AM

## 2024-05-14 NOTE — LETTER
June 12, 2024    RE:  Arley Roldan  [address] [address]        To Whom It May Concern,     Arley Roldan is currently under our care for chronic migraine headaches.    Arley requires the following  formulary tier exception:         Zolmitriptan 5mg ODT, 30 tablets per month        Sincerely,        Ian Hathaway MD

## 2024-05-16 ENCOUNTER — TELEPHONE (OUTPATIENT)
Dept: PEDIATRICS | Facility: CLINIC | Age: 54
End: 2024-05-16
Payer: COMMERCIAL

## 2024-05-16 NOTE — TELEPHONE ENCOUNTER
"Patient called with a  request to Dr. Ian Hathaway about a tier reduction for Zolmitripin. Patient states that his insurance company said that we, [employer]/ Dr. Hathaway's team \"would know which forms he would need.\" Asked patient to call insurance back and send us which forms and information is needed.     Evita Stewart on 5/16/2024 at 2:10 PM   "

## 2024-05-29 NOTE — TELEPHONE ENCOUNTER
Marco Antonio Thomas Ea/RmYesterday (9:40 AM)       Hello- Patient does not have current insurance so we are unable to Prior Auth anything. He can use GoodRX or SingleCare to help with copays until he has insurance again.  Thank You!  Zohreh Martha CPhT  Prior Auth Specialist  Note: Due to record-high volumes, our turn-around time is taking longer than usual . We are currently 2 weeks behind in the pools.  We are working diligently to submit all requests in a timely manner and in the order they are received. Please only flag TRUE URGENT requests as high priority to the pool at this time.  If you have questions on status of PA's,  please send a note/message in the active PA encounter and send back to the [facility] PA pool [[id number]].    If you have questions about the turn-around time or about our process, please reach out to our supervisor Felicita Silva.  Thank you!  RPPA (Retail Pharmacy Prior Authorization) team

## 2024-05-30 NOTE — TELEPHONE ENCOUNTER
Patient has been without medication for about 7 weeks.  Patient is wondering/requesting an appeal to be done ASAP.  States nurse was looking for a fax number, was told to use the number on the back of his card.    Dr. Hathaway can  you write and appeal letter?    Please keep patient updated.    Thank you kindly,  Sesar WILKERSON

## 2024-05-31 ENCOUNTER — VIRTUAL VISIT (OUTPATIENT)
Dept: PEDIATRICS | Facility: CLINIC | Age: 54
End: 2024-05-31
Payer: COMMERCIAL

## 2024-05-31 ENCOUNTER — TELEPHONE (OUTPATIENT)
Dept: PEDIATRICS | Facility: CLINIC | Age: 54
End: 2024-05-31

## 2024-05-31 DIAGNOSIS — G43.109 MIGRAINE WITH AURA AND WITHOUT STATUS MIGRAINOSUS, NOT INTRACTABLE: Primary | ICD-10-CM

## 2024-05-31 PROCEDURE — 99443 PR PHYSICIAN TELEPHONE EVALUATION 21-30 MIN: CPT | Performed by: INTERNAL MEDICINE

## 2024-05-31 NOTE — TELEPHONE ENCOUNTER
Sent [employer] message to patient inquiring about insurance.    Per PCP: (patient would)  benefit from a daily medication to reduce his migraine frequency, to reduce the amount of zolmitriptan he needs per month.   I can see him for a face-to-face or virtual visit to discuss options.     Per chart review, patient had virtual visit with Dr. Keene regarding migraines. Routing encounter to provider as note is not complete yet. Any changes to medication plan?    Thanks!  Lotus LINTON RN, BSN

## 2024-05-31 NOTE — TELEPHONE ENCOUNTER
Called patient.       He has insurance but they are only covering 4 tablets, not 24 per month but a a ridiculous cost to him.     States he has tried everything except botox for his migraines. Saw a neurologist 7-florencio years ago.     Was given a referral to neurology today.    Went on goodrx website, 3 packs of 3 ODT tablets each ( 9 ODT total) would cost 44.xx at [facility].      Advised to check with pharmacy to see if the goodrx coupon can be applied to his current script while we start the PA on our end.     States he will contact his pharmacy to inquire about this.    Created new encounter for PA.    JAIME Whitt on 5/31/2024 at 4:50 PM

## 2024-05-31 NOTE — PROGRESS NOTES
"Arley is a 53 year old who is being evaluated via a billable telephone visit.      Originating Location (pt. Location): Home    Distant Location (provider location):  Off-site    Assessment & Plan     Migraine with aura and without status migrainosus, not intractable  See note provided for employer regarding chronic migraine condition and accommodation to work from home.    He has had an increase in frequency of migraines due to inability to get his rescue medication covered.  Of note, he is not on a daily preventive medications, but has tried several in the past that haven't worked.  I have referred him to neurology for further management.    He hasn't discussed the sick leave policy with employer HR and not sure if his employer falls under FMLA laws.  He will have a meeting on Monday to discuss further.  I advised that if further paperwork such as FMLA is needed, I would try to fill out but he may need another appointment with me or his PCP.  He is in agreement with this plan.      - Adult Neurology  Referral; Future          BMI  Estimated body mass index is 26.61 kg/m  as calculated from the following:    Height as of 8/18/23: 1.727 m (5' 8\").    Weight as of 8/18/23: 79.4 kg (175 lb).         See Patient Instructions    Subjective   Arley is a 53 year old, presenting for the following health issues:  No chief complaint on file.    HPI     Increase in migraine frequency due to zomig coverage and quantity limits.  Needs accommodation to work from home.  Hasn't discussed sick leave policy with HR and not sure if his employer falls under FMLA laws.  Will have a meeting on Monday to discuss further.  Would like a doctors letter with his medical condition and recommendation for accommodations.      All other systems on a 10-point review are negative, unless otherwise noted in HPI        Objective           Vitals:  No vitals were obtained today due to virtual visit.    Physical Exam   General: Alert and no " distress //Respiratory: No audible wheeze, cough, or shortness of breath // Psychiatric:  Appropriate affect, tone, and pace of words            Phone call duration: 28 minutes  Signed Electronically by: Eduardo Keene MD

## 2024-05-31 NOTE — TELEPHONE ENCOUNTER
Reviewed.  Note states that patient is currently without insurance-prior authorization is not possible without insurance coverage.   I was not aware he does not have insurance coverage-is that correct?      Please call patient, he would benefit from a daily medication to reduce his migraine frequency, to reduce the amount of zolmitriptan he needs per month.   I can see him for a face-to-face or virtual visit to discuss options.  (  or we can have him speak with one of our care coordinators to assist if he is having difficulty getting insurance coverage.)

## 2024-05-31 NOTE — LETTER
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  He may benefit from accommodations to work from home a quiet and noise-free/stress-free environment such as his home.     Experiencing an increase in baseline due to a change in medication management.    Based upon the patient's medical history and your knowledge of the medical condition, estimate the frequency of flare-ups and the duration of related incapacity that the patient may have over the next six months (e.g., 1 episode every 3 months lasting 1-2 days):    Episodes twice per week.    On medications, frequency is much less (approximately 4 episodes per year).

## 2024-05-31 NOTE — LETTER
May 31, 2024      Arley Roldan  [address] [address]        To Whom It May Concern:    Arley Roldan is a patient in our clinic.  He has a chronic migraine disorder with episodic flare ups that disrupt his ability to function in the work office environment and perform his job functions.      Due to changes in his medication coverage, he has been experiencing an increase in flare ups (approximately 2 times weekly) from his baseline (approximately 4 times per year), when his migraines are better controlled.      Our medical team is working on insurance appeals to get his medications back on track and he was also referred to a neurology specialist.     Until his migraines are better controlled, he may benefit from accommodations to work from home during his flare-ups, in a lowly lit, quiet, and stress-free environment.    Please do not hesitate to contact me for further questions or comments.      Sincerely,    Eduardo Keene MD

## 2024-06-01 NOTE — TELEPHONE ENCOUNTER
Noted, thanks for clarification      Please call pt--other similar medication options:  Rizatriptan (maxalt) 10mg ODT - 30 tabs is about $30 with goodrx  Sumatriptan (imitrex) 20mg nasal spray (18 doses) is about $150 with goodrx

## 2024-06-03 ENCOUNTER — TELEPHONE (OUTPATIENT)
Dept: PEDIATRICS | Facility: CLINIC | Age: 54
End: 2024-06-03

## 2024-06-03 RX ORDER — RIZATRIPTAN BENZOATE 10 MG/1
10 TABLET, ORALLY DISINTEGRATING ORAL
Qty: 30 TABLET | Refills: 11 | Status: SHIPPED | OUTPATIENT
Start: 2024-06-03 | End: 2024-06-12

## 2024-06-03 NOTE — TELEPHONE ENCOUNTER
Prior Authorization Retail Medication Request, routed new encounter to PA pool, initial PA did not have new insurance, now new insurance has been updated, pt wants tier exception and larger quantity, #30 per month, aware insurance only covers #4 per month    Medication/Dose:   Diagnosis and ICD code (if different than what is on RX):  Zolmitriptan  New/renewal/insurance change PA/secondary ins. PA:  Previously Tried and Failed: pt informs has tried everything when lived in [place] 7 years ago, does not have any specifics, informs imitrex nasal spray and tablets  Rationale: wants larger quantity and tier override, wants 20-24 tablets per month, pt informs talked to plan and only covers #4 per month and tier 3 and ASKING FOR TIER REDUCTION    Insurance   Primary: [employer]  Insurance ID:  see scanned card    Secondary (if applicable):NO    Pharmacy Information (if different than what is on RX)    Dorys Arce, RN, BSN  [facility]

## 2024-06-03 NOTE — LETTER
June 16, 2024    RE:  Arley Roldan  [address] [address]        To Whom It May Concern,       Arley Roldan is currently under our care for chronic migraine headaches.  He currently takes zolmitriptan 5 mg ODT as needed for migraine headaches.  He requires 30 tablets each month to adequately control his symptoms.         Arley has tried other medications for acute symptoms as well as prophylactic medications for prevention that have not been effective.  Zolmitriptan has been effective for his acute migraine headaches and allows him to continue to function at work.  Arley is scheduled for a follow-up visit with neurology in the next few months.    Request formulary tier exception: Zolmitriptan 5 mg ODT number 30 tablets/month.    Sincerely,        Ian Hathaway MD

## 2024-06-03 NOTE — TELEPHONE ENCOUNTER
See other PA, will cancel this PA  Dorys Arce, RN, BSN  [facility]

## 2024-06-03 NOTE — TELEPHONE ENCOUNTER
Pt calls, spent 20 minutes discussing, pt informs:    ~discussed PCP note  ~has no idea on what he has tried and failed, informed this was when lived in [place] prior to 2018  ~pt informed PA was filed 5/14/24 and PA department was not able to process as did not have new insurance on file   ~pt aware medication is tier 3 and wants tier exception filed to lower cost and wants #30 per month (started new PA encounter and sent again to PA pool)  ~pt has appointment 7/2/24 with PCP and has neurology appointment scheduled after  ~pt agrees to Rizatriptan (maxalt) 10mg ODT - 30 tabs is about $30 with goodrx   ~pt has no idea on preventative and what has tried, thinks topamax was one he tried, pt willing to try again if PCP wants to rx or sent maxalt and defer to neurologist appointment      ROUTED TO PCP, please confirm follow up plan, route to inform pt     Telephone Information:   Mobile [phone]     Dorys Arce RN, BSN  [facility]

## 2024-06-03 NOTE — TELEPHONE ENCOUNTER
RN spoke to patient     Advised to go to [facility], either print the good rx coupon or show on phone francine     Patient states understanding     Sabrina Thorne, Registered Nurse  [facility]

## 2024-06-03 NOTE — TELEPHONE ENCOUNTER
Please call ptTrey Hoffman is $200 for 30 tabs.  The script is $30 at [facility] --script sent to [facility]. Needs to bring the goodrx coupon (on cell phone francine)

## 2024-06-03 NOTE — TELEPHONE ENCOUNTER
Message #1 left to call triage nurse     Sabrina Thorne, Registered Nurse  [facility]

## 2024-06-04 NOTE — TELEPHONE ENCOUNTER
"INFORMATION FROM [employer] MESSAGE:     Medication Tier Reduction Request  Received: Today  Sara Pickens CMA  P [facility]  Phone Number: [phone]            Previous Messages    Medication Tier Reduction Request  (Newest Message First)  View All Conversations on this Encounter  Sara Pickens CMA routed conversation to [facility]3 hours ago (7:50 AM)     Sara Pickens CMA3 hours ago (7:50 AM)       Please follow up with pts insurance company.     KINGA [place] E, JAIME routed conversation to  Support Team B (Reynaldo)4 hours ago (7:09 AM)     Arley Valero Nurse Pool - Primary Care (supporting Ian Hathaway MD)16 hours ago (6:33 PM)       [facility] said you should use the provider number on the back of the card, the pic I included in my message above.  They had no fax number to give me       Christen Mann RN4 days ago     EH  Incoming call from patient.   -History of migraines.  -Is prescribed zolmitriptan, but problems have arose due to insurance change to [place] [employer]. New insurance only covers 4 tablets of zolmitriptan per month for $90 at a tier 3. He used to get 24 tablets per month for 1/3 of the price.   -He states zolmitriptan is the only medication that has worked for him. He states he even visited with a neurology who said \"there isn't anything left to try.\" Patient has been without medication for about 7 weeks.      -Per this encounter: Ian Hathaway MD penned letter for tier reduction 5/16/24 (awaiting fax number to send this letter to). Provider suspected there could have been a specific form from insurance to fill out, but patient was not provided one by his insurance.  -See 5/14/24 encounter: Prior authorization for zolmitriptan was denied. PA team stated \"Patient does not have current insurance so we are unable to Prior Auth anything. He can use GoodRX or SingleCare to help with copays until he has " "insurance again.\" Request to see if appeal is appropriate sent high priority to Ian Hathaway MD yesterday.     -Patient is concerned about his job security with needing to work from home in dark/quiet environment rather than from the office due to his migraines. Possible to pen a letter for this? Looks like 5/28/24 Ian Hathaway MD encouraged a virtual visit for further discussion. I made him a virtual appointment for this afternoon with Onel Keene MD. He is agreeable.     Christen Mann, JAIME         Note     Arley Roldan [phone]  Christen Mann RN4 days ago     KINGA López7 days ago       Dr. Mag Tubbs would like you to schedule a virtual visit with him to discuss the work letter. Can I help you schedule that? Also did you find a fax number we can send the letter he wrote about the tier reduction?        KINGA Penn James Bernard, MD routed conversation to  Support Team B (Ma-Tc)7 days ago     Ian Hathaway MD7 days ago     J  Please schedule pt for virtual visit if he has other concerns.  Okay to use any DIAN or other held spot.          Note     Thuan Botello RN Gaul, James Bernard, MD2 weeks ago     JM  E-visit for letter?     Arley MUHAMMAD [facility] - Primary Care (supporting Ian Hathaway MD)2 weeks ago       I will try to find the fax number.  Insurance told me to have Dr Hathaway call the Providers number on the back of my ins card to request an appeal to get the tier reduced for zolmitriptin.  I sent a photo of the card.       Also, I am concerned about my employer s reaction to my missed work due to the increased migraine attacks without medication. Is it possible to have Dr Hathaway send me a letter for my employer noting that I suffer from chronic migraines so I may be covered by ADA until we can get the medication issue resolved?     Attachments   [id number]       Charleen Roldan2 weeks ago " "    SR Marcelo Tubbs,     I have a letter that Dr. Hathaway has typed up for you, I just need to know where to fax it to?     Best'     [facility]           Christen Mann, RN routed conversation to  Support Team B (Ma-)2 weeks ago     Ian Hathaway MD routed conversation to [facility]2 weeks ago     Ian Hathaway MD2 weeks ago     JG  Please fax letter         Note     aIn Hathaway MD routed conversation to Ian Hathaway MD2 weeks ago     Thuan Botello RN routed conversation to Ian Hathaway MD2 weeks ago     Arley MUHAMMAD [place] Nurse Pool - Primary Care (supporting Ian Hathaway MD)2 weeks ago       Chidi, the person I spoke only said that I should call my Dr and ask him to fill out a  tier reduction request form.   I asked if they could email the form to me but she said she couldn t and that the Dr would know what form to send.  I had been prescribed 24 ODT tablets per month.       JAIME Cabrera2 weeks ago     ALETA Tubbs,      Did insurance indicate a form that needs to be filled out? If so, please upload or drop off at the clinic. If they do not have a form, do they require a letter? How many tablets do you take per month?      Thuan LERMA RN 5/15/2024 at 7:14 AM       Ian Hathaway MD routed conversation to [facility]3 weeks ago     Ian Hathaway MD3 weeks ago     PRADEEP  I'd be happy to write a letter, but I suspect insurance has a form that I would complete for \"tier reduction\"--please have pt forward any paperwork they require or clarify what's needed (ie is it just a letter?).  Also need to know how many tablets he needs per month         Note     Thuan Botello RN Gaul, James Bernard, MD3 weeks ago     ALETA PEOPLESI prior authorization was initiated in separate encounter     Arley MUHAMMAD [place] Nurse Pool - Primary Care (supporting Ian Hathaway MD)3 weeks " "ago       Thanks Chidi, they mentioned prior authorization AND  tier reduction request  if we can t get Zolmitripin classified (down graded) to tier 2, I cannot afford the medication and I have no other options for migraine relief        JAIME Cabrera3 weeks ago     ALETA Tubbs,      Did the insurance mention a \"prior authorization\" request? I have initiated this request to our prior authorization department. This process does take at least 5-7 business days.      Thuan LERMA RN 5/14/2024 at 7:31 AM       Arley MUHAMMAD [place] Nurse Pool - Primary Care (supporting Ian Hathaway MD)3 weeks ago       I tried to refill my prescription for zolmitriptin today but [facility] is only allowing 4 tablets and is charging $90 for 4 tablets at a tier 3. They said I need to have my Doctor request a tier reduction to reduce the price and request an allowance to receive more than 4 tablets per month.  They said my Dr would know what to do.  I only have 1 pill left presently.  I am hoping you can help?   Attachments   [id number]       "

## 2024-06-05 ENCOUNTER — TRANSFERRED RECORDS (OUTPATIENT)
Dept: HEALTH INFORMATION MANAGEMENT | Facility: CLINIC | Age: 54
End: 2024-06-05
Payer: COMMERCIAL

## 2024-06-09 DIAGNOSIS — K52.9 CHRONIC DIARRHEA: ICD-10-CM

## 2024-06-10 RX ORDER — CHOLESTYRAMINE 4 G/9G
POWDER, FOR SUSPENSION ORAL
Qty: 378 G | Refills: 0 | OUTPATIENT
Start: 2024-06-10

## 2024-06-11 DIAGNOSIS — K52.9 CHRONIC DIARRHEA: ICD-10-CM

## 2024-06-11 RX ORDER — CHOLESTYRAMINE 4 G/9G
POWDER, FOR SUSPENSION ORAL
Qty: 378 G | Refills: 6 | Status: SHIPPED | OUTPATIENT
Start: 2024-06-11

## 2024-06-11 NOTE — TELEPHONE ENCOUNTER
Patient calling, requesting refill for medication. Routing to refill pool to follow protocol.     Thuan LERMA RN 6/11/2024 at 1:03 PM

## 2024-06-11 NOTE — TELEPHONE ENCOUNTER
Patient calling with update. Patient has tried rizatriptan medication and reports it doesn't do anything for him. Patient does not want to take, finding no benefit. Routing to update provider.     Thuan LERMA RN 6/11/2024 at 1:03 PM

## 2024-06-11 NOTE — TELEPHONE ENCOUNTER
Retail Pharmacy Prior Authorization Team   Phone: [phone]    PA Initiation    Medication: ZOLMITRIPTAN 5 MG PO TBDP  Insurance Company: [employer] ([facility]) - Phone [phone] Fax [phone]  Pharmacy Filling the Rx: [id number] - [place], [place] - [address]  Filling Pharmacy Phone: [phone]  Filling Pharmacy Fax:    Start Date: 6/11/2024         Note: Due to record-high volumes, our turn-around time is taking longer than usual . We are currently 2 weeks behind in the pools.   We are working diligently to submit all requests in a timely manner and in the order they are received. Please only flag TRUE URGENT requests as high priority to the pool at this time.   If you have questions on status of PA's,  please send a note/message in the active PA encounter and send back to the [facility] PA pool [[phone]].    If you have questions about the turn-around time or about our process, please reach out to our supervisor Felicita Silva.   Thank you!   RPPA (Retail Pharmacy Prior Authorization) team

## 2024-06-13 NOTE — TELEPHONE ENCOUNTER
Retail Pharmacy Prior Authorization Team   Phone: [phone]    PA DENIED  Medication is covered for a #4/30 days. They have excluded anything greater than that from coverage.   If the provider would like to appeal we will need a detailed   letter of medical necessity with clinical reasoning to start the process.   Please close the encounter when finished.   Thank you,  Susy Kumar CPhT    PA Team

## 2024-06-13 NOTE — TELEPHONE ENCOUNTER
Retail Pharmacy Prior Authorization Team   Phone: [phone]    PRIOR AUTHORIZATION DENIED    Medication: ZOLMITRIPTAN 5 MG PO TBDP  Insurance Company: [employer] ([facility]) - Phone [phone] Fax [phone]  Denial Date: 6/12/2024  Denial Reason(s): The quantity requested is excluded from plan benefits       Appeal Information:

## 2024-06-17 DIAGNOSIS — G43.109 MIGRAINE WITH AURA AND WITHOUT STATUS MIGRAINOSUS, NOT INTRACTABLE: ICD-10-CM

## 2024-06-17 RX ORDER — ZOLMITRIPTAN 5 MG/1
TABLET, ORALLY DISINTEGRATING ORAL
Qty: 30 TABLET | Refills: 3 | Status: SHIPPED | OUTPATIENT
Start: 2024-06-17

## 2024-06-17 NOTE — TELEPHONE ENCOUNTER
Patient needs refill of medication sent to NEW PHARMACY:      [facility] - [place], [place] - [address] [phone]       Maddie ADAMS, - [facility]  Primary Care- ADS, Anjum [place]  [facility]

## 2024-06-19 NOTE — TELEPHONE ENCOUNTER
Retail Pharmacy Prior Authorization Team   Phone: [phone]    Medication Appeal Initiation    Medication: ZOLMITRIPTAN 5 MG PO TBDP  Appeal Start Date:  6/19/2024  Insurance Company: [employer] ([facility])   Insurance Phone:  [phone]  Insurance Fax: [phone]   Comments:

## 2024-06-24 ENCOUNTER — TELEPHONE (OUTPATIENT)
Dept: PEDIATRICS | Facility: CLINIC | Age: 54
End: 2024-06-24
Payer: COMMERCIAL

## 2024-06-24 NOTE — TELEPHONE ENCOUNTER
FYI - Status Update    Who is Calling: patient    Update: Letter back from insurance and they denied Prior Authorization and need to file an appeal. ZOLMitriptan (ZOMIG-ZMT) 5 MG ODT     Saw neurologist 2 weeks ago and he prescribed Ubrelvy, and it doesn't work. [facility] pharmacy is trying to fill to see if that works.             Does caller want a call/response back: Yes     Could we send this information to you in [employer] or would you prefer to receive a phone call?:   Patient would like to be contacted via [employer]

## 2024-06-24 NOTE — TELEPHONE ENCOUNTER
Routed to triage--his carrier has declined to cover the 30 zomig tabs per month pt requests.  We have done PA's and a tier exception request --his carrier has refused to cover beyond their monthly limit.  I'd recommend he contact the neurology office to discuss other options; not much else we can do from our end unfortunately

## 2024-06-25 NOTE — TELEPHONE ENCOUNTER
Attempted to reach patient to relay provider message below, LVMTCB. MyChart message also sent at this time.     Thuan LERMA RN 6/25/2024 at 11:23 AM

## 2024-06-26 NOTE — TELEPHONE ENCOUNTER
RN attempt #2 to reach patient. LVMTCB and speak with nurse.     Yvette KIM RN on 6/26/2024 at 12:09 PM

## 2024-06-26 NOTE — TELEPHONE ENCOUNTER
Notified Patient  of provider's message regarding Zomig.     Person was given an opportunity to ask questions, verbalized understanding of plan, and is agreeable.     Mercedes Og RN

## 2024-06-27 ENCOUNTER — TELEPHONE (OUTPATIENT)
Dept: PEDIATRICS | Facility: CLINIC | Age: 54
End: 2024-06-27
Payer: COMMERCIAL

## 2024-06-27 NOTE — TELEPHONE ENCOUNTER
General Call    Contacts       Contact Date/Time Type Contact Phone/Fax    06/27/2024 09:37 AM CDT Phone (Incoming) Zain VASQUEZ [phone]          Reason for Call:  Pt rep [employer] would like a second level appeal to be done on rx zomig     In case you need to contact them pls reach out to the provider services if needed at [phone]    What are your questions or concerns:      Date of last appointment with provider:     Could we send this information to you in [facility] or would you prefer to receive a phone call?:       Melinda Sanchez on 6/27/2024 at 9:39 AM

## 2024-07-02 ENCOUNTER — OFFICE VISIT (OUTPATIENT)
Dept: PEDIATRICS | Facility: CLINIC | Age: 54
End: 2024-07-02
Payer: COMMERCIAL

## 2024-07-02 VITALS
HEART RATE: 75 BPM | WEIGHT: 172.1 LBS | TEMPERATURE: 96.8 F | SYSTOLIC BLOOD PRESSURE: 128 MMHG | OXYGEN SATURATION: 99 % | DIASTOLIC BLOOD PRESSURE: 80 MMHG | BODY MASS INDEX: 27.01 KG/M2 | HEIGHT: 67 IN | RESPIRATION RATE: 18 BRPM

## 2024-07-02 DIAGNOSIS — R10.13 EPIGASTRIC PAIN: ICD-10-CM

## 2024-07-02 DIAGNOSIS — Z13.220 LIPID SCREENING: ICD-10-CM

## 2024-07-02 DIAGNOSIS — Z12.5 SCREENING FOR PROSTATE CANCER: ICD-10-CM

## 2024-07-02 DIAGNOSIS — G47.00 INSOMNIA, UNSPECIFIED TYPE: ICD-10-CM

## 2024-07-02 DIAGNOSIS — Z11.59 NEED FOR HEPATITIS C SCREENING TEST: ICD-10-CM

## 2024-07-02 DIAGNOSIS — Z00.00 ROUTINE GENERAL MEDICAL EXAMINATION AT A HEALTH CARE FACILITY: Primary | ICD-10-CM

## 2024-07-02 DIAGNOSIS — G43.109 MIGRAINE WITH AURA AND WITHOUT STATUS MIGRAINOSUS, NOT INTRACTABLE: ICD-10-CM

## 2024-07-02 LAB
AMYLASE SERPL-CCNC: 90 U/L (ref 28–100)
BASOPHILS # BLD AUTO: 0 10E3/UL (ref 0–0.2)
BASOPHILS NFR BLD AUTO: 0 %
CHOLEST SERPL-MCNC: 174 MG/DL
EOSINOPHIL # BLD AUTO: 0.1 10E3/UL (ref 0–0.7)
EOSINOPHIL NFR BLD AUTO: 2 %
ERYTHROCYTE [DISTWIDTH] IN BLOOD BY AUTOMATED COUNT: 12.7 % (ref 10–15)
FASTING STATUS PATIENT QL REPORTED: YES
HCT VFR BLD AUTO: 43.8 % (ref 40–53)
HCV AB SERPL QL IA: NONREACTIVE
HDLC SERPL-MCNC: 44 MG/DL
HGB BLD-MCNC: 14.7 G/DL (ref 13.3–17.7)
IMM GRANULOCYTES # BLD: 0 10E3/UL
IMM GRANULOCYTES NFR BLD: 0 %
LDLC SERPL CALC-MCNC: 105 MG/DL
LIPASE SERPL-CCNC: 40 U/L (ref 13–60)
LYMPHOCYTES # BLD AUTO: 1.6 10E3/UL (ref 0.8–5.3)
LYMPHOCYTES NFR BLD AUTO: 25 %
MCH RBC QN AUTO: 30.9 PG (ref 26.5–33)
MCHC RBC AUTO-ENTMCNC: 33.6 G/DL (ref 31.5–36.5)
MCV RBC AUTO: 92 FL (ref 78–100)
MONOCYTES # BLD AUTO: 0.4 10E3/UL (ref 0–1.3)
MONOCYTES NFR BLD AUTO: 6 %
NEUTROPHILS # BLD AUTO: 4.3 10E3/UL (ref 1.6–8.3)
NEUTROPHILS NFR BLD AUTO: 67 %
NONHDLC SERPL-MCNC: 130 MG/DL
PLATELET # BLD AUTO: 336 10E3/UL (ref 150–450)
PSA SERPL DL<=0.01 NG/ML-MCNC: 1.48 NG/ML (ref 0–3.5)
RBC # BLD AUTO: 4.76 10E6/UL (ref 4.4–5.9)
TRIGL SERPL-MCNC: 126 MG/DL
WBC # BLD AUTO: 6.4 10E3/UL (ref 4–11)

## 2024-07-02 PROCEDURE — G0103 PSA SCREENING: HCPCS | Performed by: INTERNAL MEDICINE

## 2024-07-02 PROCEDURE — 86803 HEPATITIS C AB TEST: CPT | Performed by: INTERNAL MEDICINE

## 2024-07-02 PROCEDURE — 36415 COLL VENOUS BLD VENIPUNCTURE: CPT | Performed by: INTERNAL MEDICINE

## 2024-07-02 PROCEDURE — 80061 LIPID PANEL: CPT | Performed by: INTERNAL MEDICINE

## 2024-07-02 PROCEDURE — 83690 ASSAY OF LIPASE: CPT | Performed by: INTERNAL MEDICINE

## 2024-07-02 PROCEDURE — 82150 ASSAY OF AMYLASE: CPT | Performed by: INTERNAL MEDICINE

## 2024-07-02 PROCEDURE — 99396 PREV VISIT EST AGE 40-64: CPT | Performed by: INTERNAL MEDICINE

## 2024-07-02 PROCEDURE — 99214 OFFICE O/P EST MOD 30 MIN: CPT | Mod: 25 | Performed by: INTERNAL MEDICINE

## 2024-07-02 PROCEDURE — 85025 COMPLETE CBC W/AUTO DIFF WBC: CPT | Performed by: INTERNAL MEDICINE

## 2024-07-02 RX ORDER — FAMOTIDINE 20 MG/1
TABLET, FILM COATED ORAL
Qty: 60 TABLET | Refills: 3 | Status: SHIPPED | OUTPATIENT
Start: 2024-07-02 | End: 2024-09-16

## 2024-07-02 SDOH — HEALTH STABILITY: PHYSICAL HEALTH: ON AVERAGE, HOW MANY DAYS PER WEEK DO YOU ENGAGE IN MODERATE TO STRENUOUS EXERCISE (LIKE A BRISK WALK)?: 2 DAYS

## 2024-07-02 SDOH — HEALTH STABILITY: PHYSICAL HEALTH: ON AVERAGE, HOW MANY MINUTES DO YOU ENGAGE IN EXERCISE AT THIS LEVEL?: 30 MIN

## 2024-07-02 ASSESSMENT — ASTHMA QUESTIONNAIRES
QUESTION_2 LAST FOUR WEEKS HOW OFTEN HAVE YOU HAD SHORTNESS OF BREATH: ONCE OR TWICE A WEEK
ACT_TOTALSCORE: 24
QUESTION_4 LAST FOUR WEEKS HOW OFTEN HAVE YOU USED YOUR RESCUE INHALER OR NEBULIZER MEDICATION (SUCH AS ALBUTEROL): NOT AT ALL
QUESTION_3 LAST FOUR WEEKS HOW OFTEN DID YOUR ASTHMA SYMPTOMS (WHEEZING, COUGHING, SHORTNESS OF BREATH, CHEST TIGHTNESS OR PAIN) WAKE YOU UP AT NIGHT OR EARLIER THAN USUAL IN THE MORNING: NOT AT ALL
QUESTION_1 LAST FOUR WEEKS HOW MUCH OF THE TIME DID YOUR ASTHMA KEEP YOU FROM GETTING AS MUCH DONE AT WORK, SCHOOL OR AT HOME: NONE OF THE TIME
ACT_TOTALSCORE: 24
QUESTION_5 LAST FOUR WEEKS HOW WOULD YOU RATE YOUR ASTHMA CONTROL: COMPLETELY CONTROLLED

## 2024-07-02 ASSESSMENT — PATIENT HEALTH QUESTIONNAIRE - PHQ9
SUM OF ALL RESPONSES TO PHQ QUESTIONS 1-9: 16
SUM OF ALL RESPONSES TO PHQ QUESTIONS 1-9: 16
10. IF YOU CHECKED OFF ANY PROBLEMS, HOW DIFFICULT HAVE THESE PROBLEMS MADE IT FOR YOU TO DO YOUR WORK, TAKE CARE OF THINGS AT HOME, OR GET ALONG WITH OTHER PEOPLE: SOMEWHAT DIFFICULT

## 2024-07-02 ASSESSMENT — SOCIAL DETERMINANTS OF HEALTH (SDOH): HOW OFTEN DO YOU GET TOGETHER WITH FRIENDS OR RELATIVES?: ONCE A WEEK

## 2024-07-02 NOTE — PROGRESS NOTES
"Preventive Care Visit  [facility] CHAVA Hathaway MD, Internal Medicine - Pediatrics  Jul 2, 2024      Assessment & Plan     (Z00.00) Routine general medical examination at a health care facility  (primary encounter diagnosis)    (G43.109) Migraine with aura and without status migrainosus, not intractable  Comment:   Plan:    insurance has declined coverage for zomig odt 30tabs daily.  However pt has found a pharmacy that will fill this script for less than $200 per month.  Following up with neurology regarding emgality and ubrelvy.  Phq9 score reviewed-- job changes due to poorly controlled migraines have been stressful/ now improving    (R10.13) Epigastric pain  Comment: epigastric discomfort, bloating intermittent for past few weeks, denies constipation.  Habits: caffeine-2 sodas daily, alcohol-infrequent, no ibuprofen use.  Under a lot of stress at work.  Suspect gerd vs gastritis.  Additonal labwork today. 2 week trial of acid suppression-pt will follow-up if symptoms persist.  Plan: Amylase, Lipase, famotidine (PEPCID) 20 MG         tablet, CBC with platelets and differential          (G47.00) Insomnia, unspecified type  Comment:   Plan: sleep maintenance insomnia-generally wakes up at 3am and difficulty falling back to sleep.  Reviewed sleep hygiene, discussed cognitive behavioral therapy as best long term solution.  Pt declines counseling referral/will follow-up in the next few weeks if not improving    (Z11.59) Need for hepatitis C screening test  Comment:   Plan: Hepatitis C Screen Reflex to HCV RNA Quant and         Genotype          (Z13.220) Lipid screening  Comment:   Plan: Lipid panel reflex to direct LDL Fasting          (Z12.5) Screening for prostate cancer  Comment:   Plan: PSA, screen            BMI  Estimated body mass index is 26.7 kg/m  as calculated from the following:    Height as of this encounter: 1.71 m (5' 7.32\").    Weight as of this encounter: 78.1 kg (172 lb 1.6 " oz).       Depression Screening Follow Up        7/2/2024     6:34 AM   PHQ   PHQ-9 Total Score 16   Q9: Thoughts of better off dead/self-harm past 2 weeks Not at all         Counseling  Appropriate preventive services were discussed with this patient, including applicable screening as appropriate for fall prevention, nutrition, physical activity, Tobacco-use cessation, weight loss and cognition.  Checklist reviewing preventive services available has been given to the patient.  Reviewed patient's diet, addressing concerns and/or questions.   He is at risk for lack of exercise and has been provided with information to increase physical activity for the benefit of his well-being.   The patient was instructed to see the dentist every 6 months.   The patient's PHQ-9 score is consistent with moderate depression. He was provided with information regarding depression.           Mishel Tubbs is a 53 year old, presenting for the following:  Physical        7/2/2024     6:57 AM   Additional Questions   Roomed by Sarah Almodovar   Accompanied by KWADWO BAILEY              7/2/2024   General Health   How would you rate your overall physical health? Good   Feel stress (tense, anxious, or unable to sleep) Rather much      (!) STRESS CONCERN      7/2/2024   Nutrition   Three or more servings of calcium each day? (!) I DON'T KNOW   Diet: Regular (no restrictions)   How many servings of fruit and vegetables per day? (!) 0-1   How many sweetened beverages each day? (!) 2            7/2/2024   Exercise   Days per week of moderate/strenous exercise 2 days   Average minutes spent exercising at this level 30 min      (!) EXERCISE CONCERN      7/2/2024   Social Factors   Frequency of gathering with friends or relatives Once a week   Worry food won't last until get money to buy more No   Food not last or not have enough money for food? No   Do you have housing? (Housing is defined as stable permanent housing and does not include staying  ouside in a car, in a tent, in an abandoned building, in an overnight shelter, or couch-surfing.) Yes   Are you worried about losing your housing? No   Lack of transportation? No   Unable to get utilities (heat,electricity)? No            7/2/2024   Fall Risk   Fallen 2 or more times in the past year? No   Trouble with walking or balance? Yes   Reason Gait Speed Test Not Completed Patient declines             7/2/2024   Dental   Dentist two times every year? (!) NO            7/2/2024   TB Screening   Were you born outside of the [place]? No          Today's PHQ-9 Score:       7/2/2024     6:34 AM   PHQ-9 SCORE   PHQ-9 Total Score MyChart 16 (Moderately severe depression)   PHQ-9 Total Score 16         7/2/2024   Substance Use   Alcohol more than 3/day or more than 7/wk No   Do you use any other substances recreationally? No        Social History     Tobacco Use    Smoking status: Never    Smokeless tobacco: Never   Vaping Use    Vaping status: Never Used   Substance Use Topics    Alcohol use: Yes     Comment: Occasionally    Drug use: No             7/2/2024   One time HIV Screening   Previous HIV test? No          7/2/2024   STI Screening   New sexual partner(s) since last STI/HIV test? No      ASCVD Risk   The 10-year ASCVD risk score (Hi JUAN, et al., 2019) is: 4.3%    Values used to calculate the score:      Age: 53 years      Sex: Male      Is Non- : No      Diabetic: No      Tobacco smoker: No      Systolic Blood Pressure: 128 mmHg      Is BP treated: No      HDL Cholesterol: 52 mg/dL      Total Cholesterol: 178 mg/dL           Reviewed and updated as needed this visit by Provider                    Patient Active Problem List   Diagnosis    Migraine with aura and without status migrainosus, not intractable    Nephrolithiasis    Chronic diarrhea     Past Surgical History:   Procedure Laterality Date    CHOLECYSTECTOMY  2013    No known complications s/p surgery      COLONOSCOPY  "N/A 8/9/2022    Procedure: COLONOSCOPY (fv);  Surgeon: Ian Hills MD;  Location:  GI    ORTHOPEDIC SURGERY         Social History     Tobacco Use    Smoking status: Never    Smokeless tobacco: Never   Substance Use Topics    Alcohol use: Yes     Comment: Occasionally     Family History   Problem Relation Age of Onset    Alcoholism Brother     Pancreatic Cancer Maternal Grandmother 80    Colon Cancer Maternal Grandfather 75         Current Outpatient Medications   Medication Sig Dispense Refill    cholestyramine (QUESTRAN) 4 GM/DOSE powder TAKE 4 GRAMS BY MOUTH EVERY DAY. MIX AS DIRECTED. 378 g 6    famotidine (PEPCID) 20 MG tablet Take 2 tabs daily for 2 weeks then 1-2 tabs daily as needed 60 tablet 3    ZOLMitriptan (ZOMIG-ZMT) 5 MG ODT DISSOLVE 1 TABLET ON THE TONGUE AND SWALLOW AT ONSET OF MIGRAINE. MAY REPEAT ONCE AFTER 2 HOURS. MAXIMUM 2 TABLETS DAILY. 30 tablet 3         Review of Systems  Constitutional, neuro, ENT, endocrine, pulmonary, cardiac, gastrointestinal, genitourinary, musculoskeletal, integument and psychiatric systems are negative, except as otherwise noted.     Objective    Exam  /80 (BP Location: Right arm, Patient Position: Sitting, Cuff Size: Adult Regular)   Pulse 75   Temp 96.8  F (36  C) (Tympanic)   Resp 18   Ht 1.71 m (5' 7.32\")   Wt 78.1 kg (172 lb 1.6 oz)   SpO2 99%   BMI 26.70 kg/m     Estimated body mass index is 26.7 kg/m  as calculated from the following:    Height as of this encounter: 1.71 m (5' 7.32\").    Weight as of this encounter: 78.1 kg (172 lb 1.6 oz).    Physical Exam  GENERAL: alert and no distress  EYES: Eyes grossly normal to inspection, PERRL and conjunctivae and sclerae normal  HENT: ear canals and TM's normal, nose and mouth without ulcers or lesions  NECK: no adenopathy, no asymmetry, masses, or scars  RESP: lungs clear to auscultation - no rales, rhonchi or wheezes  CV: regular rate and rhythm, normal S1 S2, no S3 or S4, no murmur, click or " rub, no peripheral edema  ABDOMEN: soft, nontender, no hepatosplenomegaly, no masses and bowel sounds normal  MS: no gross musculoskeletal defects noted, no edema  SKIN: no suspicious lesions or rashes  NEURO: Normal strength and tone, mentation intact and speech normal  PSYCH: mentation appears normal, affect normal/bright        Signed Electronically by: Ian Hathaway MD    Answers submitted by the patient for this visit:  Patient Health Questionnaire (Submitted on 7/2/2024)  If you checked off any problems, how difficult have these problems made it for you to do your work, take care of things at home, or get along with other people?: Somewhat difficult  PHQ9 TOTAL SCORE: 16

## 2024-07-02 NOTE — PATIENT INSTRUCTIONS
"Patient Education       Start famotidine  Reduce caffeine  Follow-up 2 weeks if pain fails to resolve  Preventive Care Advice   This is general advice we often give to help people stay healthy. Your care team may have specific advice just for you. Please talk to your care team about your own preventive care needs.  Lifestyle  Exercise at least 150 minutes each week (30 minutes a day, 5 days a week).  Do muscle strengthening activities 2 days a week. These help control your weight and prevent disease.  No smoking.  Wear sunscreen to prevent skin cancer.  Have your home tested for radon every 2 to 5 years. Radon is a colorless, odorless gas that can harm your lungs. To learn more, go to www.health.[facility].[place].us and search for \"Radon in Homes.\"  Keep guns unloaded and locked up in a safe place like a safe or gun vault, or, use a gun lock and hide the keys. Always lock away bullets separately. To learn more, visit [place].gov and search for \"safe gun storage.\"  Nutrition  Eat 5 or more servings of fruits and vegetables each day.  Try wheat bread, brown rice and whole grain pasta (instead of white bread, rice, and pasta).  Get enough calcium and vitamin D. Check the label on foods and aim for 100% of the RDA (recommended daily allowance).  Regular exams  Have a dental exam and cleaning every 6 months.  See your health care team every year to talk about:  Any changes in your health.  Any medicines your care team has prescribed.  Preventive care, family planning, and ways to prevent chronic diseases.  Shots (vaccines)   HPV shots (up to age 26), if you've never had them before.  Hepatitis B shots (up to age 59), if you've never had them before.  COVID-19 shot: Get this shot when it's due.  Flu shot: Get a flu shot every year.  Tetanus shot: Get a tetanus shot every 10 years.  Pneumococcal, hepatitis A, and RSV shots: Ask your care team if you need these based on your risk.  Shingles shot (for age 50 and up).  General health " tests  Diabetes screening:  Starting at age 35, Get screened for diabetes at least every 3 years.  If you are younger than age 35, ask your care team if you should be screened for diabetes.  Cholesterol test: At age 39, start having a cholesterol test every 5 years, or more often if advised.  Bone density scan (DEXA): At age 50, ask your care team if you should have this scan for osteoporosis (brittle bones).  Hepatitis C: Get tested at least once in your life.  Abdominal aortic aneurysm screening: Talk to your doctor about having this screening if you:  Have ever smoked; and  Are biologically male; and  Are between the ages of 65 and 75.  STIs (sexually transmitted infections)  Before age 24: Ask your care team if you should be screened for STIs.  After age 24: Get screened for STIs if you're at risk. You are at risk for STIs (including HIV) if:  You are sexually active with more than one person.  You don't use condoms every time.  You or a partner was diagnosed with a sexually transmitted infection.  If you are at risk for HIV, ask about PrEP medicine to prevent HIV.  Get tested for HIV at least once in your life, whether you are at risk for HIV or not.  Cancer screening tests  Cervical cancer screening: If you have a cervix, begin getting regular cervical cancer screening tests at age 21. Most people who have regular screenings with normal results can stop after age 65. Talk about this with your provider.  Breast cancer scan (mammogram): If you've ever had breasts, begin having regular mammograms starting at age 40. This is a scan to check for breast cancer.  Colon cancer screening: It is important to start screening for colon cancer at age 45.  Have a colonoscopy test every 10 years (or more often if you're at risk) Or, ask your provider about stool tests like a FIT test every year or Cologuard test every 3 years.  To learn more about your testing options, visit: www.[employer].IMayGou/301168.pdf.  For help making a  decision, visit: wendie/at85019.  Prostate cancer screening test: If you have a prostate and are age 55 to 69, ask your provider if you would benefit from a yearly prostate cancer screening test.  Lung cancer screening: If you are a current or former smoker age 50 to 80, ask your care team if ongoing lung cancer screenings are right for you.  For informational purposes only. Not to replace the advice of your health care provider. Copyright   2023 [facility]. All rights reserved. Clinically reviewed by the [facility] Transitions Program. [employer] [facility] [id number] - REV 04/24.  Learning About Stress  What is stress?     Stress is your body's response to a hard situation. Your body can have a physical, emotional, or mental response. Stress is a fact of life for most people, and it affects everyone differently. What causes stress for you may not be stressful for someone else.  A lot of things can cause stress. You may feel stress when you go on a job interview, take a test, or run a race. This kind of short-term stress is normal and even useful. It can help you if you need to work hard or react quickly. For example, stress can help you finish an important job on time.  Long-term stress is caused by ongoing stressful situations or events. Examples of long-term stress include long-term health problems, ongoing problems at work, or conflicts in your family. Long-term stress can harm your health.  How does stress affect your health?  When you are stressed, your body responds as though you are in danger. It makes hormones that speed up your heart, make you breathe faster, and give you a burst of energy. This is called the fight-or-flight stress response. If the stress is over quickly, your body goes back to normal and no harm is done.  But if stress happens too often or lasts too long, it can have bad effects. Long-term stress can make you more likely to get sick, and it can make symptoms of some diseases worse.  If you tense up when you are stressed, you may develop neck, shoulder, or low back pain. Stress is linked to high blood pressure and heart disease.  Stress also harms your emotional health. It can make you lemus, tense, or depressed. Your relationships may suffer, and you may not do well at work or school.  What can you do to manage stress?  You can try these things to help manage stress:   Do something active. Exercise or activity can help reduce stress. Walking is a great way to get started. Even everyday activities such as housecleaning or yard work can help.  Try yoga or nasim chi. These techniques combine exercise and meditation. You may need some training at first to learn them.  Do something you enjoy. For example, listen to music or go to a movie. Practice your hobby or do volunteer work.  Meditate. This can help you relax, because you are not worrying about what happened before or what may happen in the future.  Do guided imagery. Imagine yourself in any setting that helps you feel calm. You can use online videos, books, or a teacher to guide you.  Do breathing exercises. For example:  From a standing position, bend forward from the waist with your knees slightly bent. Let your arms dangle close to the floor.  Breathe in slowly and deeply as you return to a standing position. Roll up slowly and lift your head last.  Hold your breath for just a few seconds in the standing position.  Breathe out slowly and bend forward from the waist.  Let your feelings out. Talk, laugh, cry, and express anger when you need to. Talking with supportive friends or family, a counselor, or a philip leader about your feelings is a healthy way to relieve stress. Avoid discussing your feelings with people who make you feel worse.  Write. It may help to write about things that are bothering you. This helps you find out how much stress you feel and what is causing it. When you know this, you can find better ways to cope.  What can you do  "to prevent stress?  You might try some of these things to help prevent stress:  Manage your time. This helps you find time to do the things you want and need to do.  Get enough sleep. Your body recovers from the stresses of the day while you are sleeping.  Get support. Your family, friends, and community can make a difference in how you experience stress.  Limit your news feed. Avoid or limit time on social media or news that may make you feel stressed.  Do something active. Exercise or activity can help reduce stress. Walking is a great way to get started.  Where can you learn more?  Go to https://www.[employer].net/patiented  Enter N032 in the search box to learn more about \"Learning About Stress.\"  Current as of: October 24, 2023               Content Version: 14.0    5929-8830 "[employer].   Care instructions adapted under license by your healthcare professional. If you have questions about a medical condition or this instruction, always ask your healthcare professional. "[employer] disclaims any warranty or liability for your use of this information.         "

## 2024-07-16 NOTE — TELEPHONE ENCOUNTER
Retail Pharmacy Prior Authorization Team   Phone: [phone]    MEDICATION APPEAL DENIED    Medication: ZOLMITRIPTAN 5 MG PO TBDP  Insurance Company: [employer] ([facility])   Denial Date: 7/16/2024  Denial Reason(s): DENIED. IT'S A PLAN EXCLUSION. HIGHER QUANTITY IS NOT AVAILABLE UNDER HIS PLAN.   Second Level Appeal Information:   Patient Notified: NO  Central Prior Authorization Team ONLY: Second level appeals will be managed by the clinic staff and provider. Please contact the [place] Prior Authorization Team if additional information about the denial is needed.    Denial given over the phone via [employer]   CASE ID# [id number]  I will update with denial letter once  received.

## 2024-07-18 NOTE — TELEPHONE ENCOUNTER
Please let patient know that appeal was denied. If he has questions he can follow-up with Dr. Hathaway.     Christen Berumen PA-C  (Covering for Dr. Hathaway)   [Father] : father

## 2024-07-19 NOTE — TELEPHONE ENCOUNTER
Called the patient at [phone]   - Informed the patient that his ZOLMITRIPTAN 5 MG PO TBDP medication was denied   - Patient states that he is aware of the denial and discussed it with Dr. Hathaway at his recent appointment   - Patient is following up with neurology as well     Neelam CARUSO RN   [facility]

## 2024-09-15 DIAGNOSIS — R10.13 EPIGASTRIC PAIN: ICD-10-CM

## 2024-09-16 RX ORDER — FAMOTIDINE 20 MG/1
20 TABLET, FILM COATED ORAL 2 TIMES DAILY PRN
Qty: 120 TABLET | Refills: 3 | Status: SHIPPED | OUTPATIENT
Start: 2024-09-16

## 2025-01-06 DIAGNOSIS — G43.109 MIGRAINE WITH AURA AND WITHOUT STATUS MIGRAINOSUS, NOT INTRACTABLE: ICD-10-CM

## 2025-01-06 RX ORDER — ZOLMITRIPTAN 5 MG/1
TABLET, ORALLY DISINTEGRATING ORAL
Qty: 30 TABLET | Refills: 3 | Status: SHIPPED | OUTPATIENT
Start: 2025-01-06

## 2025-04-23 ENCOUNTER — OFFICE VISIT (OUTPATIENT)
Dept: URGENT CARE | Facility: URGENT CARE | Age: 55
End: 2025-04-23
Payer: COMMERCIAL

## 2025-04-23 ENCOUNTER — ANCILLARY PROCEDURE (OUTPATIENT)
Dept: GENERAL RADIOLOGY | Facility: CLINIC | Age: 55
End: 2025-04-23
Attending: PHYSICIAN ASSISTANT
Payer: COMMERCIAL

## 2025-04-23 VITALS
SYSTOLIC BLOOD PRESSURE: 144 MMHG | RESPIRATION RATE: 18 BRPM | TEMPERATURE: 98.2 F | HEIGHT: 67 IN | OXYGEN SATURATION: 98 % | HEART RATE: 95 BPM | DIASTOLIC BLOOD PRESSURE: 92 MMHG | WEIGHT: 173 LBS | BODY MASS INDEX: 27.15 KG/M2

## 2025-04-23 DIAGNOSIS — J06.9 UPPER RESPIRATORY TRACT INFECTION, UNSPECIFIED TYPE: Primary | ICD-10-CM

## 2025-04-23 DIAGNOSIS — J06.9 UPPER RESPIRATORY TRACT INFECTION, UNSPECIFIED TYPE: ICD-10-CM

## 2025-04-23 LAB
DEPRECATED S PYO AG THROAT QL EIA: NEGATIVE
S PYO DNA THROAT QL NAA+PROBE: NOT DETECTED

## 2025-04-23 PROCEDURE — 99213 OFFICE O/P EST LOW 20 MIN: CPT | Performed by: PHYSICIAN ASSISTANT

## 2025-04-23 PROCEDURE — 3080F DIAST BP >= 90 MM HG: CPT | Performed by: PHYSICIAN ASSISTANT

## 2025-04-23 PROCEDURE — 3077F SYST BP >= 140 MM HG: CPT | Performed by: PHYSICIAN ASSISTANT

## 2025-04-23 PROCEDURE — 87651 STREP A DNA AMP PROBE: CPT | Performed by: PHYSICIAN ASSISTANT

## 2025-04-23 PROCEDURE — 71046 X-RAY EXAM CHEST 2 VIEWS: CPT | Mod: TC | Performed by: RADIOLOGY

## 2025-04-23 RX ORDER — BENZONATATE 200 MG/1
200 CAPSULE ORAL EVERY 8 HOURS PRN
Qty: 20 CAPSULE | Refills: 0 | Status: SHIPPED | OUTPATIENT
Start: 2025-04-23

## 2025-04-23 RX ORDER — ALBUTEROL SULFATE 90 UG/1
2 INHALANT RESPIRATORY (INHALATION) EVERY 6 HOURS PRN
Qty: 18 G | Refills: 0 | Status: SHIPPED | OUTPATIENT
Start: 2025-04-23

## 2025-04-23 NOTE — LETTER
2025    Arley Roldan   1970        To Whom it May Concern;    Please excuse Arley Roldan from work, 25 - 25. He may return to work sooner if symptoms resolve.     Sincerely,        Maddie Thakkar PA-C

## 2025-04-23 NOTE — PROGRESS NOTES
Assessment & Plan     1. Upper respiratory tract infection, unspecified type (Primary)  - XR Chest 2 Views; Future  - Streptococcus A Rapid Screen w/Reflex to PCR - Clinic Collect  - Group A Streptococcus PCR Throat Swab  - albuterol (PROAIR HFA/PROVENTIL HFA/VENTOLIN HFA) 108 (90 Base) MCG/ACT inhaler; Inhale 2 puffs into the lungs every 6 hours as needed for shortness of breath, wheezing or cough.  Dispense: 18 g; Refill: 0  - benzonatate (TESSALON) 200 MG capsule; Take 1 capsule (200 mg) by mouth every 8 hours as needed for cough.  Dispense: 20 capsule; Refill: 0      On exam, lungs are CTAB without sign of respiratory distress. Throat without PTA or RPA and TM clear B/L.  CXR is negative for acute abnormality per my read  Supportive cares encouraged   Tessalon can be used for a dry cough  Albuterol RX was given         Diagnosis and treatment plan was reviewed with patient and/or family.   We went over any labs or imaging. Discussed worsening symptoms or little to no relief despite treatment plan to follow-up with PCP or return to clinic.  Patient verbalizes understanding. All questions were addressed and answered.     [facility]    CHIEF COMPLAINT:   Chief Complaint   Patient presents with    Urgent Care     Coughing, throats when coughing only for 4 days now      Subjective     Arley is a 54 year old male who presents to clinic today for evaluation. Symptoms started 4 days ago with cough, congestion, sore throat.  Mostly having a dry cough. Feels burning in his lungs, and some shortness of breath.   Feels a bit of wheezing and crackles in the lungs. This is worse in the AM. Hx of exercise induced asthma.     He has felt feverish, he notices this at night.    Hx of pneumonia in the past.     Wife with similar symptoms, who is now improving.       Past Medical History:   Diagnosis Date    Migraines     Nephrolithiasis      Past Surgical History:   Procedure  "Laterality Date    CHOLECYSTECTOMY  2013    No known complications s/p surgery      COLONOSCOPY N/A 8/9/2022    Procedure: COLONOSCOPY (fv);  Surgeon: Ian Hills MD;  Location:  GI    ORTHOPEDIC SURGERY       Social History     Tobacco Use    Smoking status: Never    Smokeless tobacco: Never   Substance Use Topics    Alcohol use: Yes     Comment: Occasionally     Current Outpatient Medications   Medication Sig Dispense Refill    cholestyramine (QUESTRAN) 4 GM/DOSE powder TAKE 4 GRAMS BY MOUTH EVERY DAY. MIX AS DIRECTED. 378 g 6    ZOLMitriptan (ZOMIG-ZMT) 5 MG ODT DISSOLVE 1 TABLET ON THE TONGUE AND SWALLOW AT ONSET OF MIGRAINE. MAY REPEAT ONCE AFTER 2 HOURS. MAXIMUM 2 TABLETS DAILY. 30 tablet 2    famotidine (PEPCID) 20 MG tablet Take 1 tablet (20 mg) by mouth 2 times daily as needed (reflux). (Patient not taking: Reported on 4/23/2025) 120 tablet 3     No current facility-administered medications for this visit.     No Known Allergies    10 point ROS of systems were all negative except for pertinent positives noted in my HPI.      Exam:   BP (!) 150/95 (BP Location: Right arm, Patient Position: Sitting, Cuff Size: Adult Regular)   Pulse 95   Temp 98.2  F (36.8  C) (Temporal)   Resp 18   Ht 1.71 m (5' 7.32\")   Wt 78.5 kg (173 lb)   SpO2 98%   BMI 26.84 kg/m    Constitutional: healthy, alert and no distress  Head: Normocephalic, atraumatic.  Eyes: conjunctiva clear, no drainage  ENT: TMs clear and shiny anita, nasal mucosa pink and moist, throat erythematous without trismus or drooling.   Neck: neck is supple, no cervical lymphadenopathy or nuchal rigidity  Cardiovascular: RRR  Respiratory: CTA bilaterally, no rhonchi or rales  Skin: no rashes  Neurologic: Speech clear, gait normal. Moves all extremities.    Results for orders placed or performed in visit on 04/23/25   Streptococcus A Rapid Screen w/Reflex to PCR - Clinic Collect     Status: Normal    Specimen: Throat; Swab   Result Value Ref Range    " Group A Strep antigen Negative Negative     CXR -- No acute abnormality per my read, pending radiology report

## 2025-04-23 NOTE — PATIENT INSTRUCTIONS
Your CXR looks OK per my read, if the radiologist comes back with a different report, you will be contacted.     Tessalon for a dry cough, if your cough becomes more productive, stop tessalon and switch to OTC Mucinex    Albuterol as needed for wheezing    Fluids and rest  Humidified air at night  Honey for cough  Tylenol / Ibuprofen for sore throat

## 2025-04-23 NOTE — PROGRESS NOTES
Urgent Care Clinic Visit    Chief Complaint   Patient presents with    Urgent Care     Coughing, sore throat, for 4 days now               4/23/2025    10:07 AM   Additional Questions   Roomed by Shara Yo   Accompanied by self         4/23/2025   Forms   Any forms needing to be completed Yes     No  Does the patient have a sore throat and either history of fever >100.4 in the previous 24 hours without a cough or recent exposure to a known case of strep throat? No

## 2025-06-02 ENCOUNTER — PATIENT OUTREACH (OUTPATIENT)
Dept: CARE COORDINATION | Facility: CLINIC | Age: 55
End: 2025-06-02
Payer: COMMERCIAL

## 2025-06-27 ENCOUNTER — TELEPHONE (OUTPATIENT)
Dept: PEDIATRICS | Facility: CLINIC | Age: 55
End: 2025-06-27
Payer: COMMERCIAL

## 2025-06-27 DIAGNOSIS — G43.109 MIGRAINE WITH AURA AND WITHOUT STATUS MIGRAINOSUS, NOT INTRACTABLE: ICD-10-CM

## 2025-06-27 NOTE — TELEPHONE ENCOUNTER
Prior Authorization Retail Medication Request    Medication/Dose: ZOLMitriptan (ZOMIG-ZMT) 5 MG ODT     Insurance   Primary: [employer]BS- will be uploading most recent insurance card- keep a look out for most recent      Secondary (if applicable):[employer] - patient will also be uploading this information today      Clinic Information  Preferred routing pool for dept communication: [facility] Pool team.     Yvette López RN

## 2025-06-30 NOTE — TELEPHONE ENCOUNTER
Per pharmacy processing info is    BIN [id number]  [facility] A4  GROUP CRRA  ID [id number]

## 2025-06-30 NOTE — TELEPHONE ENCOUNTER
PA Initiation    Medication: ZOLMITRIPTAN 5 MG PO TBDP  Insurance Company: [employer] Non-Specialty PA's - Phone [phone] Fax [phone]  Pharmacy Filling the Rx: [facility] - [place], [place] - [address]  Filling Pharmacy Phone: [phone]  Filling Pharmacy Fax: [phone]  Start Date: 6/29/2025

## 2025-07-01 NOTE — TELEPHONE ENCOUNTER
Prior Authorization Approval    Medication: ZOLMITRIPTAN 5 MG PO TBDP  Authorization Effective Date: 5/31/2025  Authorization Expiration Date: 6/30/2026  Approved Dose/Quantity:   Reference #:     Insurance Company: [employer] Non-Specialty PA's - Phone [phone] Fax [phone]  Expected CoPay: $    CoPay Card Available:      Financial Assistance Needed:   Which Pharmacy is filling the prescription: [facility] - [place], [place] - [facility] [address]  Pharmacy Notified: YES  Patient Notified: **Instructed pharmacy to notify patient when script is ready to /ship.**

## 2025-07-02 RX ORDER — ZOLMITRIPTAN 5 MG/1
TABLET, ORALLY DISINTEGRATING ORAL
Qty: 18 TABLET | Refills: 11 | Status: SHIPPED | OUTPATIENT
Start: 2025-07-02

## 2025-08-09 ENCOUNTER — HEALTH MAINTENANCE LETTER (OUTPATIENT)
Age: 55
End: 2025-08-09

## 2025-08-19 ENCOUNTER — OFFICE VISIT (OUTPATIENT)
Dept: PEDIATRICS | Facility: CLINIC | Age: 55
End: 2025-08-19
Attending: INTERNAL MEDICINE
Payer: COMMERCIAL

## 2025-08-19 VITALS
SYSTOLIC BLOOD PRESSURE: 122 MMHG | HEIGHT: 68 IN | TEMPERATURE: 97.5 F | RESPIRATION RATE: 14 BRPM | BODY MASS INDEX: 26.42 KG/M2 | HEART RATE: 74 BPM | OXYGEN SATURATION: 97 % | DIASTOLIC BLOOD PRESSURE: 80 MMHG | WEIGHT: 174.3 LBS

## 2025-08-19 DIAGNOSIS — Z13.220 LIPID SCREENING: ICD-10-CM

## 2025-08-19 DIAGNOSIS — F41.9 ANXIETY: ICD-10-CM

## 2025-08-19 DIAGNOSIS — Z00.00 ROUTINE GENERAL MEDICAL EXAMINATION AT A HEALTH CARE FACILITY: Primary | ICD-10-CM

## 2025-08-19 DIAGNOSIS — Z12.5 SCREENING FOR PROSTATE CANCER: ICD-10-CM

## 2025-08-19 DIAGNOSIS — Z80.0 FAMILY HISTORY OF PANCREATIC CANCER: ICD-10-CM

## 2025-08-19 LAB
ALBUMIN SERPL BCG-MCNC: 4.2 G/DL (ref 3.5–5.2)
ALP SERPL-CCNC: 85 U/L (ref 40–150)
ALT SERPL W P-5'-P-CCNC: 24 U/L (ref 0–70)
ANION GAP SERPL CALCULATED.3IONS-SCNC: 10 MMOL/L (ref 7–15)
AST SERPL W P-5'-P-CCNC: 24 U/L (ref 0–45)
BILIRUB SERPL-MCNC: 0.5 MG/DL
BUN SERPL-MCNC: 9.2 MG/DL (ref 6–20)
CALCIUM SERPL-MCNC: 9.3 MG/DL (ref 8.8–10.4)
CHLORIDE SERPL-SCNC: 106 MMOL/L (ref 98–107)
CHOLEST SERPL-MCNC: 179 MG/DL
CREAT SERPL-MCNC: 1.21 MG/DL (ref 0.67–1.17)
EGFRCR SERPLBLD CKD-EPI 2021: 71 ML/MIN/1.73M2
FASTING STATUS PATIENT QL REPORTED: YES
FASTING STATUS PATIENT QL REPORTED: YES
GLUCOSE SERPL-MCNC: 90 MG/DL (ref 70–99)
HCO3 SERPL-SCNC: 25 MMOL/L (ref 22–29)
HDLC SERPL-MCNC: 46 MG/DL
LDLC SERPL CALC-MCNC: 112 MG/DL
NONHDLC SERPL-MCNC: 133 MG/DL
POTASSIUM SERPL-SCNC: 4.2 MMOL/L (ref 3.4–5.3)
PROT SERPL-MCNC: 6.9 G/DL (ref 6.4–8.3)
PSA SERPL DL<=0.01 NG/ML-MCNC: 0.84 NG/ML (ref 0–3.5)
SODIUM SERPL-SCNC: 141 MMOL/L (ref 135–145)
TRIGL SERPL-MCNC: 106 MG/DL

## 2025-08-19 PROCEDURE — 80053 COMPREHEN METABOLIC PANEL: CPT | Performed by: INTERNAL MEDICINE

## 2025-08-19 PROCEDURE — 99213 OFFICE O/P EST LOW 20 MIN: CPT | Mod: 25 | Performed by: INTERNAL MEDICINE

## 2025-08-19 PROCEDURE — 3074F SYST BP LT 130 MM HG: CPT | Performed by: INTERNAL MEDICINE

## 2025-08-19 PROCEDURE — 3079F DIAST BP 80-89 MM HG: CPT | Performed by: INTERNAL MEDICINE

## 2025-08-19 PROCEDURE — 90471 IMMUNIZATION ADMIN: CPT | Performed by: INTERNAL MEDICINE

## 2025-08-19 PROCEDURE — 99396 PREV VISIT EST AGE 40-64: CPT | Mod: 25 | Performed by: INTERNAL MEDICINE

## 2025-08-19 PROCEDURE — 80061 LIPID PANEL: CPT | Performed by: INTERNAL MEDICINE

## 2025-08-19 PROCEDURE — 1126F AMNT PAIN NOTED NONE PRSNT: CPT | Performed by: INTERNAL MEDICINE

## 2025-08-19 PROCEDURE — G0103 PSA SCREENING: HCPCS | Performed by: INTERNAL MEDICINE

## 2025-08-19 PROCEDURE — 36415 COLL VENOUS BLD VENIPUNCTURE: CPT | Performed by: INTERNAL MEDICINE

## 2025-08-19 PROCEDURE — 90677 PCV20 VACCINE IM: CPT | Performed by: INTERNAL MEDICINE

## 2025-08-19 SDOH — HEALTH STABILITY: PHYSICAL HEALTH: ON AVERAGE, HOW MANY MINUTES DO YOU ENGAGE IN EXERCISE AT THIS LEVEL?: 20 MIN

## 2025-08-19 SDOH — HEALTH STABILITY: PHYSICAL HEALTH: ON AVERAGE, HOW MANY DAYS PER WEEK DO YOU ENGAGE IN MODERATE TO STRENUOUS EXERCISE (LIKE A BRISK WALK)?: 1 DAY

## 2025-08-19 ASSESSMENT — ASTHMA QUESTIONNAIRES
QUESTION_2 LAST FOUR WEEKS HOW OFTEN HAVE YOU HAD SHORTNESS OF BREATH: NOT AT ALL
QUESTION_3 LAST FOUR WEEKS HOW OFTEN DID YOUR ASTHMA SYMPTOMS (WHEEZING, COUGHING, SHORTNESS OF BREATH, CHEST TIGHTNESS OR PAIN) WAKE YOU UP AT NIGHT OR EARLIER THAN USUAL IN THE MORNING: NOT AT ALL
QUESTION_5 LAST FOUR WEEKS HOW WOULD YOU RATE YOUR ASTHMA CONTROL: COMPLETELY CONTROLLED
QUESTION_1 LAST FOUR WEEKS HOW MUCH OF THE TIME DID YOUR ASTHMA KEEP YOU FROM GETTING AS MUCH DONE AT WORK, SCHOOL OR AT HOME: NONE OF THE TIME
QUESTION_4 LAST FOUR WEEKS HOW OFTEN HAVE YOU USED YOUR RESCUE INHALER OR NEBULIZER MEDICATION (SUCH AS ALBUTEROL): NOT AT ALL
ACT_TOTALSCORE: 25

## 2025-08-19 ASSESSMENT — PAIN SCALES - GENERAL: PAINLEVEL_OUTOF10: NO PAIN (0)

## 2025-08-19 ASSESSMENT — SOCIAL DETERMINANTS OF HEALTH (SDOH): HOW OFTEN DO YOU GET TOGETHER WITH FRIENDS OR RELATIVES?: ONCE A WEEK

## 2025-08-20 ENCOUNTER — PATIENT OUTREACH (OUTPATIENT)
Dept: CARE COORDINATION | Facility: CLINIC | Age: 55
End: 2025-08-20
Payer: COMMERCIAL

## (undated) DEVICE — KIT ENDO TURNOVER/PROCEDURE W/CLEAN A SCOPE LINERS 103888

## (undated) DEVICE — RX ELEVIEW SUBMUCOSAL ING 10ML AMP 05391530190015

## (undated) RX ORDER — FENTANYL CITRATE 0.05 MG/ML
INJECTION, SOLUTION INTRAMUSCULAR; INTRAVENOUS
Status: DISPENSED
Start: 2022-08-09